# Patient Record
Sex: MALE
[De-identification: names, ages, dates, MRNs, and addresses within clinical notes are randomized per-mention and may not be internally consistent; named-entity substitution may affect disease eponyms.]

---

## 2022-09-01 ENCOUNTER — HOSPITAL ENCOUNTER (OUTPATIENT)
Dept: HOSPITAL 47 - ORPAIN | Age: 67
Discharge: HOME | End: 2022-09-01
Attending: ANESTHESIOLOGY
Payer: MEDICARE

## 2022-09-01 VITALS — RESPIRATION RATE: 18 BRPM

## 2022-09-01 VITALS — TEMPERATURE: 97 F

## 2022-09-01 VITALS — DIASTOLIC BLOOD PRESSURE: 71 MMHG | SYSTOLIC BLOOD PRESSURE: 129 MMHG | HEART RATE: 62 BPM

## 2022-09-01 DIAGNOSIS — I10: ICD-10-CM

## 2022-09-01 DIAGNOSIS — Z98.1: ICD-10-CM

## 2022-09-01 DIAGNOSIS — R51.9: ICD-10-CM

## 2022-09-01 DIAGNOSIS — G31.9: ICD-10-CM

## 2022-09-01 DIAGNOSIS — Z90.49: ICD-10-CM

## 2022-09-01 DIAGNOSIS — G91.2: Primary | ICD-10-CM

## 2022-09-01 DIAGNOSIS — E78.00: ICD-10-CM

## 2022-09-01 DIAGNOSIS — R26.89: ICD-10-CM

## 2022-09-01 DIAGNOSIS — Z91.030: ICD-10-CM

## 2022-09-01 LAB
GLUCOSE BLD-MCNC: 164 MG/DL (ref 70–110)
GLUCOSE CSF-MCNC: 100 MG/DL (ref 40–70)
NUC CELL # CSF: 0 U/L (ref 0–5)
RBC # CSF: 1 U/L (ref 0–10)
SPECIMEN VOL CSF: 7 ML

## 2022-09-01 PROCEDURE — 99152 MOD SED SAME PHYS/QHP 5/>YRS: CPT

## 2022-09-01 PROCEDURE — 87075 CULTR BACTERIA EXCEPT BLOOD: CPT

## 2022-09-01 PROCEDURE — 99153 MOD SED SAME PHYS/QHP EA: CPT

## 2022-09-01 PROCEDURE — 82945 GLUCOSE OTHER FLUID: CPT

## 2022-09-01 PROCEDURE — 84157 ASSAY OF PROTEIN OTHER: CPT

## 2022-09-01 PROCEDURE — 87205 SMEAR GRAM STAIN: CPT

## 2022-09-01 PROCEDURE — 87070 CULTURE OTHR SPECIMN AEROBIC: CPT

## 2022-09-01 PROCEDURE — 62270 DX LMBR SPI PNXR: CPT

## 2022-09-01 PROCEDURE — 88108 CYTOPATH CONCENTRATE TECH: CPT

## 2022-09-01 PROCEDURE — 89050 BODY FLUID CELL COUNT: CPT

## 2022-09-01 NOTE — P.PCN
Date of Procedure: 09/01/22


Description of Procedure: 


Procedure: 


1. Lumbar puncture for CSF collection








PREOPERATIVE DIAGNOSIS: Normal pressure hydrocephalus, and balance issues





POSTOPERATIVE DIAGNOSIS: Normal pressure hydrocephalus, and balance issues 





SURGEON: Lisandra Lyn 





ANESTHESIA:  Local with 1% lidocaine, and IV sedation : Versed 2 mg, and 

fentanyl 100 g





Sedation supervision start time: 0832


Sedation supervision end  time: 0917 





EBL: None.





Specimen removed: 26 mL of total CSF in collecting tube X4








PROCEDURE INDICATION:  The patient had history of patient had a history of 

headache, balance issues.  His neurologist recommended for 30 mL of CSF removal,

and opening, closing pressures .  Consulted for lumbar puncture for CSF 

collection send it for analysis. 





PROCEDURE DESCRIPTION: The patient was seen and identified.  Risks, benefits, 

complications, and alternatives were discussed with the patient. The patient 

agreed to proceed with the procedure and signed the consent, and vital signs 

were stable. 





Patient was taken to the procedure area, and time out was completed. The patient

was placed in left lateral  position on procedure.  . The lumbosacral area was 

prepped and draped in the usual sterile fashion. Critical pause was taken. Vital

signs were closely monitored during the procedure.





Posterior superior iliac crest landmarks was identified .  The midline lumbar 

space also identified.  Approximately at the level of L4-L5 attempted , L3-L4 

interspinous space,  skin and deeper tissues were localized with 5 mL of 1% 

lidocaine.   Using a 22  gauge 3.5 spinal needle entered into subarachnoid 

space. Clear CSF came out of the spinal needle.  7-8 mL of CSF fluid collected 

in each tube 4.  Total CSF collected 26 mL.  Needle was withdrawn intact, skin 

was cleansed, and bandages were applied. 





Opening pressure: 17 cm


Closing pressure: 6-7 cm 





COMPLICATIONS: None.





DISPOSITION / PLANS: The patient was placed in a supine position and transferred

to the recovery area in a stable condition for observation. Patient was 

discharged from the recovery room after meeting discharge criteria. Home 

discharge instructions given to the patient by the staff. The patient was 

reexamined prior to discharge.

## 2024-09-11 ENCOUNTER — HOSPITAL ENCOUNTER (INPATIENT)
Dept: HOSPITAL 47 - EC | Age: 69
LOS: 5 days | Discharge: NURSING, REHAB OR LONG-TERM CARE | DRG: 872 | End: 2024-09-16
Attending: INTERNAL MEDICINE | Admitting: INTERNAL MEDICINE
Payer: MEDICARE

## 2024-09-11 DIAGNOSIS — F32.A: ICD-10-CM

## 2024-09-11 DIAGNOSIS — L03.113: ICD-10-CM

## 2024-09-11 DIAGNOSIS — E78.5: ICD-10-CM

## 2024-09-11 DIAGNOSIS — Z79.899: ICD-10-CM

## 2024-09-11 DIAGNOSIS — G20.A1: ICD-10-CM

## 2024-09-11 DIAGNOSIS — Z79.84: ICD-10-CM

## 2024-09-11 DIAGNOSIS — B95.61: ICD-10-CM

## 2024-09-11 DIAGNOSIS — Z79.4: ICD-10-CM

## 2024-09-11 DIAGNOSIS — I10: ICD-10-CM

## 2024-09-11 DIAGNOSIS — Z72.0: ICD-10-CM

## 2024-09-11 DIAGNOSIS — K21.9: ICD-10-CM

## 2024-09-11 DIAGNOSIS — L25.9: ICD-10-CM

## 2024-09-11 DIAGNOSIS — M70.21: ICD-10-CM

## 2024-09-11 DIAGNOSIS — E11.65: ICD-10-CM

## 2024-09-11 DIAGNOSIS — A41.9: Primary | ICD-10-CM

## 2024-09-11 LAB
ALBUMIN SERPL-MCNC: 3.7 G/DL (ref 3.5–5)
ALP SERPL-CCNC: 102 U/L (ref 38–126)
ALT SERPL-CCNC: 25 U/L (ref 4–49)
ANION GAP SERPL CALC-SCNC: 12 MMOL/L
AST SERPL-CCNC: 25 U/L (ref 17–59)
BASOPHILS # BLD AUTO: 0 K/UL (ref 0–0.2)
BASOPHILS NFR BLD AUTO: 0 %
BUN SERPL-SCNC: 14 MG/DL (ref 9–20)
CALCIUM SPEC-MCNC: 9.6 MG/DL (ref 8.4–10.2)
CHLORIDE SERPL-SCNC: 98 MMOL/L (ref 98–107)
CO2 SERPL-SCNC: 23 MMOL/L (ref 22–30)
EOSINOPHIL # BLD AUTO: 0.2 K/UL (ref 0–0.7)
EOSINOPHIL NFR BLD AUTO: 1 %
ERYTHROCYTE [DISTWIDTH] IN BLOOD BY AUTOMATED COUNT: 5.1 M/UL (ref 4.3–5.9)
ERYTHROCYTE [DISTWIDTH] IN BLOOD: 14.8 % (ref 11.5–15.5)
GLUCOSE SERPL-MCNC: 285 MG/DL (ref 74–99)
HCT VFR BLD AUTO: 43.6 % (ref 39–53)
HGB BLD-MCNC: 13.5 GM/DL (ref 13–17.5)
LYMPHOCYTES # SPEC AUTO: 1.7 K/UL (ref 1–4.8)
LYMPHOCYTES NFR SPEC AUTO: 14 %
MCH RBC QN AUTO: 26.5 PG (ref 25–35)
MCHC RBC AUTO-ENTMCNC: 31.1 G/DL (ref 31–37)
MCV RBC AUTO: 85.4 FL (ref 80–100)
MONOCYTES # BLD AUTO: 1 K/UL (ref 0–1)
MONOCYTES NFR BLD AUTO: 8 %
NEUTROPHILS # BLD AUTO: 9.1 K/UL (ref 1.3–7.7)
NEUTROPHILS NFR BLD AUTO: 75 %
PLATELET # BLD AUTO: 320 K/UL (ref 150–450)
POTASSIUM SERPL-SCNC: 3.7 MMOL/L (ref 3.5–5.1)
PROT SERPL-MCNC: 6.4 G/DL (ref 6.3–8.2)
SODIUM SERPL-SCNC: 133 MMOL/L (ref 137–145)
WBC # BLD AUTO: 12.1 K/UL (ref 3.8–10.6)

## 2024-09-11 PROCEDURE — 70450 CT HEAD/BRAIN W/O DYE: CPT

## 2024-09-11 PROCEDURE — 36410 VNPNXR 3YR/> PHY/QHP DX/THER: CPT

## 2024-09-11 PROCEDURE — 99285 EMERGENCY DEPT VISIT HI MDM: CPT

## 2024-09-11 PROCEDURE — 96365 THER/PROPH/DIAG IV INF INIT: CPT

## 2024-09-11 PROCEDURE — 87040 BLOOD CULTURE FOR BACTERIA: CPT

## 2024-09-11 PROCEDURE — 80048 BASIC METABOLIC PNL TOTAL CA: CPT

## 2024-09-11 PROCEDURE — 76937 US GUIDE VASCULAR ACCESS: CPT

## 2024-09-11 PROCEDURE — 51702 INSERT TEMP BLADDER CATH: CPT

## 2024-09-11 PROCEDURE — 87324 CLOSTRIDIUM AG IA: CPT

## 2024-09-11 PROCEDURE — 86140 C-REACTIVE PROTEIN: CPT

## 2024-09-11 PROCEDURE — 80053 COMPREHEN METABOLIC PANEL: CPT

## 2024-09-11 PROCEDURE — 83605 ASSAY OF LACTIC ACID: CPT

## 2024-09-11 PROCEDURE — 85025 COMPLETE CBC W/AUTO DIFF WBC: CPT

## 2024-09-11 PROCEDURE — 36415 COLL VENOUS BLD VENIPUNCTURE: CPT

## 2024-09-11 RX ADMIN — ACETAMINOPHEN PRN MG: 325 TABLET, FILM COATED ORAL at 23:49

## 2024-09-11 RX ADMIN — CEFAZOLIN STA MLS/HR: 330 INJECTION, POWDER, FOR SOLUTION INTRAMUSCULAR; INTRAVENOUS at 22:17

## 2024-09-11 RX ADMIN — CEFAZOLIN SCH: 330 INJECTION, POWDER, FOR SOLUTION INTRAMUSCULAR; INTRAVENOUS at 23:45

## 2024-09-11 NOTE — ED
Extremity Problem HPI





- General


Chief complaint: Extremity Problem,Nontraumatic


Stated complaint: R hand swelling


Time Seen by Provider: 09/11/24 22:10


Source: patient, EMS, RN notes reviewed


Mode of arrival: EMS


Limitations: no limitations





- History of Present Illness


Initial comments: 





68-year-old male presenting for medical attention for right wrist redness x 3 

days.  Patient states he has been having right wrist and right elbow redness, 

swelling, and warmth.  Patient states he was seen at [facility] 3 days ago where

they drained his right elbow and discharged him on antibiotics.  He states 

symptoms have been worsening.  He admits fever, chills.  Denies nausea/vomiting.

 Past medical history includes hypertension and diabetes.  Denies trauma or 

injury.





- Related Data


                                Home Medications











 Medication  Instructions  Recorded  Confirmed


 


Atorvastatin [Lipitor] 40 mg PO HS 08/31/22 08/31/22


 


Carbidopa/Levodopa 1 tab PO 0800 08/31/22 08/31/22





[Carbidopa-Levodopa  Tab]   


 


Carbidopa/Levodopa 1 tab PO 1400 08/31/22 08/31/22





[Carbidopa-Levodopa  Tab]   


 


Colestipol HCl 2 tab PO BID 08/31/22 08/31/22


 


Empagliflozin [Jardiance] 1 tab PO DAILY 08/31/22 08/31/22


 


Escitalopram [Lexapro] 5 mg PO Q48H 08/31/22 08/31/22


 


Escitalopram [Lexapro] 10 mg PO Q48H 08/31/22 08/31/22


 


Insulin Glargine,Hum.rec.anlog 54 unit SQ DAILY 08/31/22 08/31/22





[Basaglar Kwikpen U-100]   


 


Losartan Potassium 100 mg PO DAILY 08/31/22 08/31/22


 


Pantoprazole Sodium 40 mg PO DAILY 08/31/22 08/31/22


 


Unk Mens Vitamin 1 tab PO DAILY 08/31/22 08/31/22


 


hydrALAZINE HCL 25 mg PO BID 08/31/22 08/31/22


 


hydroCHLOROthiazide 50 mg PO DAILY 08/31/22 08/31/22


 


metFORMIN HCL 1,000 mg PO BID 08/31/22 08/31/22











                                    Allergies











Allergy/AdvReac Type Severity Reaction Status Date / Time


 


bee venom protein (honey bee) Allergy Intermediate Swelling Verified 09/01/22 

07:58














Review of Systems


ROS Statement: 


Those systems with pertinent positive or pertinent negative responses have been 

documented in the HPI.





ROS Other: All systems not noted in ROS Statement are negative.





Past Medical History


Past Medical History: Diabetes Mellitus, GERD/Reflux, Hyperlipidemia, 

Hypertension, Osteoarthritis (OA), Prostate Disorder


Additional Past Medical History / Comment(s): parkinsons , arthritis in knees,? 

hydrocephalus. gait and balance off and some confusion. Bursitis right elbow


History of Any Multi-Drug Resistant Organisms: None Reported


Past Surgical History: Cholecystectomy


Additional Past Surgical History / Comment(s): cervical fusion 4-7,


Past Anesthesia/Blood Transfusion Reactions: No Reported Reaction


Additional Past Anesthesia/Blood Transfusion Reaction / Comment(s): no blood 

transfusions


Past Psychological History: Depression


Smoking Status: Former smoker


Past Alcohol Use History: Rare


Past Drug Use History: None Reported





- Past Family History


  ** Father


Family Medical History: CVA/TIA, Hypertension





  ** Mother


Family Medical History: Diabetes Mellitus





General Exam


Limitations: no limitations


General appearance: alert, in no apparent distress


Head exam: Present: atraumatic, normocephalic, normal inspection


Respiratory exam: Present: normal lung sounds bilaterally.  Absent: respiratory 

distress, wheezes, rales, rhonchi, stridor


Cardiovascular Exam: Present: regular rate, normal rhythm, normal heart sounds. 

Absent: systolic murmur, diastolic murmur, rubs, gallop, clicks


  ** Right


Shoulder Exam: Present: normal inspection, full ROM.  Absent: tenderness, 

swelling


Upper Arm exam: Present: normal inspection, full ROM.  Absent: tenderness, 

swelling


Elbow exam: Present: full ROM, tenderness, swelling, erythema.  Absent: normal 

inspection (Diffuse erythema, warmth, edema on posterior right elbow with 

shallow, purulent ulceration)


Forearm Wrist exam: Present: full ROM, tenderness, swelling.  Absent: normal 

inspection (Diffuse erythema, warmth, edema present on dorsal aspect of right 

wrist.  No purulence or fluctuant masses.  Noncircumferential.)


Vascular: Present: normal capillary refill, radial pulse (Cap refill less than 2

seconds.).  Absent: vascular compromise


Neurological exam: Present: alert, oriented X3


Psychiatric exam: Present: normal affect, normal mood


Skin exam: Present: warm, intact, normal color





Course


                                   Vital Signs











  09/11/24 09/11/24





  20:41 22:00


 


Temperature 100.1 F H 


 


Pulse Rate 89 81


 


Respiratory 20 16





Rate  


 


Blood Pressure 150/79 131/81


 


O2 Sat by Pulse 95 96





Oximetry  














Medical Decision Making





- Medical Decision Making





Was pt. sent in by a medical professional or institution (, PA, NP, urgent 

care, hospital, or nursing home...) When possible be specific


@ -Sent by [facility]


Did you speak to anyone other than the patient for history (EMS, parent, family,

police, friend...)? What history was obtained from this source 


@ -No


Did you review nursing and triage notes (agree or disagree)? Why? 


@ -I reviewed and agree with nursing and triage notes


Were old charts reviewed (outside hosp., previous admission, EMS record, old 

EKG, old radiological studies, urgent care reports/EKG's, nursing home records)?

Report findings 


@ -No old charts were reviewed


Differential Diagnosis (chest pain, altered mental status, abdominal pain women,

abdominal pain men, vaginal bleeding, weakness, fever, dyspnea, syncope, 

headache, dizziness, GI bleed, back pain, seizure, CVA, palpatations, mental 

health, musculoskeletal)? 


@ -Differential Musculoskeletal


Muscular strain, contusion, ligament sprain, fracture, arthritis, septic 

arthritis, bursitis, cellulitis, muscle spasm, nerve compression, DVT, arterial 

occlusion, herpes zoster, electrolyte abnormality, tumor.... This is not meant 

to be in all inclusive list


EKG interpreted by me (3pts min.).


@ -None


X-rays interpreted by me (1pt min.).


@ -None done


CT interpreted by me (1pt min.).


@ -None done


U/S interpreted by me (1pt. min.).


@ -None done


What testing was considered but not performed or refused? (CT, X-rays, U/S, 

labs)? Why?


@ -None


What meds were considered but not given or refused? Why?


@ -None


Did you discuss the management of the patient with other professionals 

(professionals i.e. , PA, NP, lab, RT, psych nurse, , , 

teacher, , )? Give summary


@ -Spoke with Dr. Hung who accepts admission at this time for cellulitis with 

failed outpatient treatment, requesting orthopedic consultation


Was smoking cessation discussed for >3mins.?


@ -No


Was critical care preformed (if so, how long)?


@ -No


Were there social determinants of health that impacted care today? How? 

(Homelessness, low income, unemployed, alcoholism, drug addiction, transportat

ion, low edu. Level, literacy, decrease access to med. care, care home, rehab)?


@ -No


Was there de-escalation of care discussed even if they declined (Discuss DNR or 

withdrawal of care, Hospice)? DNR status


@ -No


What co-morbidities impacted this encounter? (DM, HTN, Smoking, COPD, CAD, 

Cancer, CVA, ARF, Chemo, Hep., AIDS, mental health diagnosis, sleep apnea, 

morbid obesity)?


@ -None


Was patient admitted / discharged? Hospital course, mention meds given and 

route, prescriptions, significant lab abnormalities, going to OR and other 

pertinent info.


@ -Patient was admitted.  This is a 68-year-old male sent by [facility] with 

history of hypertension and diabetes presenting for right wrist and elbow r

edness x 3 days.  Has been on oral antibiotics for cellulitis with worsening of 

symptoms.  Vital signs remarkable for temperature of 100.1, otherwise within 

normal limits.  On examination, right wrist and elbow are warm, erythematous, 

and edematous.  Full range of motion, neurovascularly intact.  Lab work 

remarkable for white blood cell count of 12 with left shift, lactic 2.9, CRP 

4.4.  Blood cultures were taken, patient was started on IV cefazolin and IV 

fluids.  I spoke with Dr. Hung who accepts admission at this time for 

cellulitis with failed outpatient treatment, requesting orthopedic consultation.

 Patient is agreeable to plan.  Case discussed with my ED attending Dr. Canseco.


Undiagnosed new problem with uncertain prognosis?


@ -No


Drug Therapy requiring intensive monitoring for toxicity (Heparin, Nitro, 

Insulin, Cardizem)?


@ -No


Were any procedures done?


@ -No


Diagnosis/symptom?


@ -Cellulitis of right wrist and elbow


Acute, or Chronic, or Acute on Chronic?


@ -Acute


Uncomplicated (without systemic symptoms) or Complicated (systemic symptoms)?


@ -Complicated


Side effects of treatment?


@ -No


Exacerbation, Progression, or Severe Exacerbation?


@ -No


Poses a threat to life or bodily function? How? (Chest pain, USA, MI, pneumonia,

PE, COPD, DKA, ARF, appy, cholecystitis, CVA, Diverticulitis, Homicidal, 

Suicidal, threat to staff... and all critical care pts)


@ -Yes





- Lab Data


Result diagrams: 


                                 09/11/24 20:49





                                 09/11/24 20:49


                                   Lab Results











  09/11/24 09/11/24 09/11/24 Range/Units





  20:49 20:49 20:49 


 


WBC  12.1 H    (3.8-10.6)  k/uL


 


RBC  5.10    (4.30-5.90)  m/uL


 


Hgb  13.5    (13.0-17.5)  gm/dL


 


Hct  43.6    (39.0-53.0)  %


 


MCV  85.4    (80.0-100.0)  fL


 


MCH  26.5    (25.0-35.0)  pg


 


MCHC  31.1    (31.0-37.0)  g/dL


 


RDW  14.8    (11.5-15.5)  %


 


Plt Count  320    (150-450)  k/uL


 


MPV  8.4    


 


Neutrophils %  75    %


 


Lymphocytes %  14    %


 


Monocytes %  8    %


 


Eosinophils %  1    %


 


Basophils %  0    %


 


Neutrophils #  9.1 H    (1.3-7.7)  k/uL


 


Lymphocytes #  1.7    (1.0-4.8)  k/uL


 


Monocytes #  1.0    (0-1.0)  k/uL


 


Eosinophils #  0.2    (0-0.7)  k/uL


 


Basophils #  0.0    (0-0.2)  k/uL


 


Hypochromasia  Moderate    


 


Sodium   133 L   (137-145)  mmol/L


 


Potassium   3.7   (3.5-5.1)  mmol/L


 


Chloride   98   ()  mmol/L


 


Carbon Dioxide   23   (22-30)  mmol/L


 


Anion Gap   12   mmol/L


 


BUN   14   (9-20)  mg/dL


 


Creatinine   0.70   (0.66-1.25)  mg/dL


 


Est GFR (CKD-EPI)AfAm   >90   (>60 ml/min/1.73 sqM)  


 


Est GFR (CKD-EPI)NonAf   >90   (>60 ml/min/1.73 sqM)  


 


Glucose   285 H   (74-99)  mg/dL


 


Plasma Lactic Acid Peter    2.9 H*  (0.7-2.0)  mmol/L


 


Calcium   9.6   (8.4-10.2)  mg/dL


 


Total Bilirubin   0.5   (0.2-1.3)  mg/dL


 


AST   25   (17-59)  U/L


 


ALT   25   (4-49)  U/L


 


Alkaline Phosphatase   102   ()  U/L


 


C-Reactive Protein   4.4 H   (<1.0)  mg/dL


 


Total Protein   6.4   (6.3-8.2)  g/dL


 


Albumin   3.7   (3.5-5.0)  g/dL














Disposition


Clinical Impression: 


 Cellulitis of right upper extremity





Disposition: ADMITTED AS IP TO THIS [facility]


Referrals: 


Dhaval August MD [Primary Care Provider] - 1-2 days


Time of Disposition: 23:12 PAST MEDICAL HISTORY:  Cholecystitis     Cholelithiasis     PCOS (polycystic ovarian syndrome)

## 2024-09-12 LAB — GLUCOSE BLD-MCNC: 171 MG/DL (ref 70–110)

## 2024-09-12 RX ADMIN — ENOXAPARIN SODIUM SCH MG: 40 INJECTION SUBCUTANEOUS at 20:33

## 2024-09-12 RX ADMIN — LOSARTAN POTASSIUM SCH MG: 50 TABLET, FILM COATED ORAL at 17:09

## 2024-09-12 RX ADMIN — TAMSULOSIN HYDROCHLORIDE SCH MG: 0.4 CAPSULE ORAL at 17:09

## 2024-09-12 RX ADMIN — ESCITALOPRAM OXALATE SCH MG: 10 TABLET, FILM COATED ORAL at 20:35

## 2024-09-12 RX ADMIN — CARBIDOPA AND LEVODOPA SCH EACH: 25; 100 TABLET ORAL at 17:08

## 2024-09-12 RX ADMIN — ATORVASTATIN CALCIUM SCH MG: 40 TABLET, FILM COATED ORAL at 20:35

## 2024-09-12 NOTE — P.CONS
History of Present Illness





- Reason for Consult


Consult date: 09/12/24


Right elbow infection not healing


Requesting physician: Laurie Olguin





- Chief Complaint


Right elbow pain and swelling x days





- History of Present Illness





Patient is a 68-year-old  male with a past medical history significant 

for diabetes mellitus hypertension hyperlipidemia reflux osteoarthritis prostate

disorder in this patient apparently was recently admitted at [facility] from 9/2/2024 till 9/5/2024 with the patient has been treated for UTI 

he was also diagnosed with a right elbow olecranon bursitis status post bedside 

aspiration by the hospitalist and the patient was subsequent discharged home on 

oral Augmentin and doxycycline patient now presenting to [facility] ER 

complaining of worsening pain to the right elbow area patient describing the 

pain to the right elbow to be sharp almost 10 out of 10 in severity by the time 

he presented to hospital with associated swelling and redness but denies having 

any drainage patient mention he has been taking his antibiotic as advised 

patient on presentation to the hospital did have low-grade fever 100.1 F 

patient was not tachycardic hypotensive or hypoxic he did have white count of 

12.1 with a left shift creatinine 0.70 lactic acid 2.9 liver isms are normal 

patient did receive a dose of cefazolin and admitted to hospital infectious he 

was consulted for further management of antibiotic therapy patient has been eval

by orthopedics not recommending any surgical intervention at this point





Review of Systems





Positive point and negatives has been  mentioned in the HPI, complete review of 

systems was performed and all other systems are negative





Past Medical History


Past Medical History: Diabetes Mellitus, GERD/Reflux, Hyperlipidemia, 

Hypertension, Osteoarthritis (OA), Prostate Disorder


Additional Past Medical History / Comment(s): parkinsons , arthritis in knees,? 

hydrocephalus. gait and balance off and some confusion. Bursitis right elbow


History of Any Multi-Drug Resistant Organisms: None Reported


Past Surgical History: Cholecystectomy


Additional Past Surgical History / Comment(s): cervical fusion 4-7,


Past Anesthesia/Blood Transfusion Reactions: No Reported Reaction


Additional Past Anesthesia/Blood Transfusion Reaction / Comm: no blood transfusi

ons


Smoking Status: Never smoker





- Past Family History


  ** Father


Family Medical History: CVA/TIA, Hypertension





  ** Mother


Family Medical History: Diabetes Mellitus


Additional Family Medical History / Comment(s): parkinsons





Medications and Allergies


                                Home Medications











 Medication  Instructions  Recorded  Confirmed  Type


 


Atorvastatin [Lipitor] 40 mg PO HS 08/31/22 09/12/24 History


 


Carbidopa/Levodopa 1 tab PO Q8H 08/31/22 09/12/24 History





[Carbidopa-Levodopa  Tab]    


 


Empagliflozin [Jardiance] 10 mg PO DAILY 08/31/22 09/12/24 History


 


Escitalopram [Lexapro] 10 mg PO HS 08/31/22 09/12/24 History


 


Insulin Glargine,Hum.rec.anlog 54 unit SQ DAILY 08/31/22 09/12/24 History





[Basaglar Kwikpen U-100]    


 


Losartan Potassium 100 mg PO DAILY 08/31/22 09/12/24 History


 


Pantoprazole Sodium 40 mg PO DAILY 08/31/22 09/12/24 History


 


hydrALAZINE HCL 25 mg PO BID 08/31/22 09/12/24 History


 


0.9 % Sodium Chloride [Sodium 10 ml IV Q12H 09/12/24 09/12/24 History





Chloride Flush]    


 


0.9 % Sodium Chloride [Sodium 10 ml IV Q12H PRN 09/12/24 09/12/24 History





Chloride Flush]    


 


Acetaminophen [Tylenol 8 Hour] 650 mg PO Q6H PRN 09/12/24 09/12/24 History


 


Amoxic-Pot Clav 875-125Mg 1 tab PO Q12HR 09/12/24 09/12/24 History





[Augmentin 875-125]    


 


DULoxetine HCL [Cymbalta] 30 mg PO DAILY 09/12/24 09/12/24 History


 


Doxycycline Monohydrate 100 mg PO BID 09/12/24 09/12/24 History


 


EPINEPHrine (Auto Inject) [Epipen] 0.3 mg IM ONCE PRN 09/12/24 09/12/24 History


 


L.acidoph,Paracasei, B.lactis 1 cap PO HS 09/12/24 09/12/24 History





[Probiotic]    


 


Naloxone HCl [Narcan] 4 mg NASAL ONCE PRN 09/12/24 09/12/24 History


 


Nicotine 14Mg/24Hr Patch [Habitrol 1 patch TRANSDERM DAILY 09/12/24 09/12/24 

History





14Mg/24Hr Patch]    


 


QUEtiapine [SEROquel] 50 mg PO HS 09/12/24 09/12/24 History


 


Tamsulosin HCl [Flomax] 0.4 mg PO DAILY 09/12/24 09/12/24 History


 


cefTRIAXone SODIUM [Ceftriaxone] 2 gm IV Q24H 09/12/24 09/12/24 History


 


hydroCHLOROthiazide [Hydrodiuril] 25 mg PO DAILY 09/12/24 09/12/24 History


 


metFORMIN HCL [Glucophage] 1,000 mg PO BID 09/12/24 09/12/24 History


 


oxyCODONE HCL [oxyCODONE HCL (IR)] 5 mg PO Q6H PRN 09/12/24 09/12/24 History








                                    Allergies











Allergy/AdvReac Type Severity Reaction Status Date / Time


 


bee venom protein (honey bee) Allergy Intermediate Swelling Verified 09/12/24 

09:04














Physical Exam


Vitals: 


                                   Vital Signs











  Temp Pulse Pulse Pulse Resp BP BP


 


 09/12/24 08:00      18  


 


 09/12/24 07:00  97.7 F    70  18   172/82


 


 09/12/24 02:25  98.5 F   75   18   148/71


 


 09/12/24 02:03  98.1 F      


 


 09/12/24 02:02   73    19  153/82 


 


 09/12/24 01:32   76    20  153/81 


 


 09/12/24 00:51  98.9 F      


 


 09/11/24 23:59   81    20  151/78 


 


 09/11/24 23:00   84    15  117/70 


 


 09/11/24 22:00   81    16  131/81 


 


 09/11/24 20:41  100.1 F H  89    20  150/79 














  Pulse Ox


 


 09/12/24 08:00 


 


 09/12/24 07:00  97


 


 09/12/24 02:25  95


 


 09/12/24 02:03 


 


 09/12/24 02:02  98


 


 09/12/24 01:32  96


 


 09/12/24 00:51 


 


 09/11/24 23:59  97


 


 09/11/24 23:00  98


 


 09/11/24 22:00  96


 


 09/11/24 20:41  95








                                Intake and Output











 09/11/24 09/12/24 09/12/24





 22:59 06:59 14:59


 


Intake Total  240 120


 


Output Total   300


 


Balance  240 -180


 


Intake:   


 


  Oral  240 120


 


Output:   


 


  Urine   300


 


Other:   


 


  Voiding Method   Urinal





   Diaper


 


  # Voids  2 1


 


  # Bowel Movements   2


 


  Weight 88.36 kg 88.36 kg 














GENERAL DESCRIPTION: Elderly male lying in bed, no distress. No tachypnea or 

accessory muscle of respiration use.


HEENT: Shows Pallor , no scleral icterus. Oral mucous membrane is dry. No 

pharyngeal erythema or thrush


NECK: Trachea central, no thyromegaly.


LUNGS: Unlabored breathing. Clear to auscultation anteriorly. No wheeze or 

crackle.


HEART: S1, S2, regular rate and rhythm. No loud murmur


ABDOMEN: Soft, no tenderness , guarding or rigidity, no organomegaly


EXTREMITIES: Right elbow did have swelling redness which is warm and tender to 

touch


SKIN: No rash, no masses palpable.


NEUROLOGICAL: The patient is awake, alert, oriented x3, mood and affect normal.














Results


CBC & Chem 7: 


                                 09/11/24 20:49





                                 09/11/24 20:49


Labs: 


                  Abnormal Lab Results - Last 24 Hours (Table)











  09/11/24 09/11/24 09/11/24 Range/Units





  20:49 20:49 20:49 


 


WBC  12.1 H    (3.8-10.6)  k/uL


 


Neutrophils #  9.1 H    (1.3-7.7)  k/uL


 


Sodium   133 L   (137-145)  mmol/L


 


Glucose   285 H   (74-99)  mg/dL


 


POC Glucose (mg/dL)     ()  mg/dL


 


Plasma Lactic Acid Peter    2.9 H*  (0.7-2.0)  mmol/L


 


C-Reactive Protein   4.4 H   (<1.0)  mg/dL














  09/12/24 Range/Units





  01:27 


 


WBC   (3.8-10.6)  k/uL


 


Neutrophils #   (1.3-7.7)  k/uL


 


Sodium   (137-145)  mmol/L


 


Glucose   (74-99)  mg/dL


 


POC Glucose (mg/dL)  171 H  ()  mg/dL


 


Plasma Lactic Acid Peter   (0.7-2.0)  mmol/L


 


C-Reactive Protein   (<1.0)  mg/dL














Assessment and Plan


(1) Cellulitis of right upper extremity


Current Visit: Yes   Status: Acute   Code(s): L03.113 - CELLULITIS OF RIGHT 

UPPER LIMB   SNOMED Code(s): 90858057454487960


   





(2) Olecranon bursitis, right elbow


Current Visit: Yes   Status: Acute   Code(s): M70.21 - OLECRANON BURSITIS, RIGHT

ELBOW   SNOMED Code(s): 568655230990779


   





(3) Failure of outpatient treatment


Current Visit: Yes   Status: Acute   Code(s): Z78.9 - OTHER SPECIFIED HEALTH 

STATUS   SNOMED Code(s): 882123709


   





(4) MSSA (methicillin susceptible Staphylococcus aureus) infection


Current Visit: Yes   Status: Acute   Code(s): A49.01 - METHICILLIN SUSCEP STAPH 

INFECTION, UNSP SITE   SNOMED Code(s): 167389474


   


Plan: 





1patient presented to hospital with right elbow pain swelling redness which is 

likely related to right elbow olecranon bursitis that has been drained at [facility] during his admission from 9/2 through 9/5/2024 I was able to 

review the culture and they have been growing MSSA patient seem to have failed 

outpatient oral antibiotic therapy


2-we will start the patient on cefazolin 2 g every 8 hour


3-will benefit from surgical I&D


We will follow on clinical condition and cultures to further adjust medication 

if needed


Thank you for this consultation we will follow the patient along with you


Dictation was produced using dragon dictation software. please excuse any 

grammatical, word or spelling errors. Reviewed and agree.

## 2024-09-12 NOTE — XR
EXAMINATION TYPE: XR shoulder complete LT

 

DATE OF EXAM: 9/12/2024

 

CLINICAL HISTORY: pain

 

COMPARISON: NONE

 

TECHNIQUE:  Three views of the left shoulder are obtained.

 

FINDINGS:  There is no acute fracture/dislocation evident.  The acromioclavicular and glenohumeral cindy
int spaces appear within normal limits.  The visualized ribs are intact and unremarkable.

 

IMPRESSION: 

 

1. There is no acute fracture or dislocation. 

 

ICD 10 NO FRACTURE, INITIAL EVALUATION

## 2024-09-12 NOTE — P.HPIM
History of Present Illness


H&P Date: 09/12/24





Patient is a 68-year-old male who presents with right elbow swelling and pain.  

He states that he has been having difficulty with balance/weakness and recently 

fell.  Since his fall he has had right elbow swelling and erythemahe went to 

[facility] (according to his wife) the other day where they drained it and 

prescribed outpatient antibiotics.  Since then the elbow has not improved so he 

was admitted here.  He rates the pain a 6/10.  He denies chest pain, shortness 

of breath, abdominal pain.  He endorses nonbloody diarrhea since beginning the 

antibiotics from [facility].





WBCs 12.1, lactic acid 2.9, CRP 4.4


Afebrile, hypertensive.


 


ED documentation reviewed.  Unable to obtain records from [facility].


 


Review of systems:


Pertinent positives and negatives as discussed in HPI, a complete review of 

systems was performed and all other systems are negative.


 


 


Social history:


Tobacco: chew tobacco occasional 


Alcohol: denies


Recreational drugs: denies


Travel: denies


Occupation: retired


 


Physical examination:


Vital signs are stable.


General: No acute distress.


HEENT:  Head exam is unremarkable. 


Lungs: Bilateral breath sounds present; no rhonchi, wheezes, or rales.


Heart: Rate and rhythm are regular.  S1S2 present.  No murmurs/rubs/gallops.


Abdomen: Nontender.


Extremities: No edema present in the lower extremities.  Right elbow: Edema of 

the olecranon bursa, healing abrasion present, mild swelling of the forearm, 

wrist, hand, full range of motion of the elbow.


 


Assessment/Plan:


#.  Right elbow cellulitis


Maintain IV ceftriaxone.


Consult infectious disease.


 


#. Hypertension


Continue


 


#. Diabetes mellitus type 2


Insulin sliding scale





#. Non-bloody diarrhea


Obtain C. difficile PCR.


 


#. Hyperlipidemia


Continue





#.  Parkinson's


Continue carbidopalevodopa.


 


DVT prophylaxis: Lovenox


 


Chronic conditions:  HTN, DM2, HLD, Parkinson's


 


The patient is admitted with an anticipated plus than 2 midnight stay for 

evaluation of right elbow pain and swelling.


CODE STATUS: Full code


Discussed with: Patient


Anticipated discharge place: [facility]





Dr. Enmanuel MD


I have performed a history and physical examination and medical decision making 

of this patient, discussed the same with the the resident, and agree with the 

assessment and plan as written.  I performed brief physical exam.








Past Medical History


Past Medical History: Diabetes Mellitus, GERD/Reflux, Hyperlipidemia, 

Hypertension, Osteoarthritis (OA), Prostate Disorder


Additional Past Medical History / Comment(s): parkinsons , arthritis in knees,? 

hydrocephalus. gait and balance off and some confusion. Bursitis right elbow


History of Any Multi-Drug Resistant Organisms: None Reported


Past Surgical History: Cholecystectomy


Additional Past Surgical History / Comment(s): cervical fusion 4-7,


Past Anesthesia/Blood Transfusion Reactions: No Reported Reaction


Additional Past Anesthesia/Blood Transfusion Reaction / Comment(s): no blood 

transfusions


Smoking Status: Never smoker





- Past Family History


  ** Father


Family Medical History: CVA/TIA, Hypertension





  ** Mother


Family Medical History: Diabetes Mellitus





Medications and Allergies


                                Home Medications











 Medication  Instructions  Recorded  Confirmed  Type


 


Atorvastatin [Lipitor] 40 mg PO HS 08/31/22 09/12/24 History


 


Carbidopa/Levodopa 1 tab PO Q8H 08/31/22 09/12/24 History





[Carbidopa-Levodopa  Tab]    


 


Empagliflozin [Jardiance] 10 mg PO DAILY 08/31/22 09/12/24 History


 


Escitalopram [Lexapro] 10 mg PO HS 08/31/22 09/12/24 History


 


Insulin Glargine,Hum.rec.anlog 54 unit SQ DAILY 08/31/22 09/12/24 History





[Basaglar Kwikpen U-100]    


 


Losartan Potassium 100 mg PO DAILY 08/31/22 09/12/24 History


 


Pantoprazole Sodium 40 mg PO DAILY 08/31/22 09/12/24 History


 


hydrALAZINE HCL 25 mg PO BID 08/31/22 09/12/24 History


 


0.9 % Sodium Chloride [Sodium 10 ml IV Q12H 09/12/24 09/12/24 History





Chloride Flush]    


 


0.9 % Sodium Chloride [Sodium 10 ml IV Q12H PRN 09/12/24 09/12/24 History





Chloride Flush]    


 


Acetaminophen [Tylenol 8 Hour] 650 mg PO Q6H PRN 09/12/24 09/12/24 History


 


Amoxic-Pot Clav 875-125Mg 1 tab PO Q12HR 09/12/24 09/12/24 History





[Augmentin 875-125]    


 


DULoxetine HCL [Cymbalta] 30 mg PO DAILY 09/12/24 09/12/24 History


 


Doxycycline Monohydrate 100 mg PO BID 09/12/24 09/12/24 History


 


EPINEPHrine (Auto Inject) [Epipen] 0.3 mg IM ONCE PRN 09/12/24 09/12/24 History


 


L.acidoph,Paracasei, B.lactis 1 cap PO HS 09/12/24 09/12/24 History





[Probiotic]    


 


Naloxone HCl [Narcan] 4 mg NASAL ONCE PRN 09/12/24 09/12/24 History


 


Nicotine 14Mg/24Hr Patch [Habitrol 1 patch TRANSDERM DAILY 09/12/24 09/12/24 

History





14Mg/24Hr Patch]    


 


QUEtiapine [SEROquel] 50 mg PO HS 09/12/24 09/12/24 History


 


Tamsulosin HCl [Flomax] 0.4 mg PO DAILY 09/12/24 09/12/24 History


 


cefTRIAXone SODIUM [Ceftriaxone] 2 gm IV Q24H 09/12/24 09/12/24 History


 


hydroCHLOROthiazide [Hydrodiuril] 25 mg PO DAILY 09/12/24 09/12/24 History


 


metFORMIN HCL [Glucophage] 1,000 mg PO BID 09/12/24 09/12/24 History


 


oxyCODONE HCL [oxyCODONE HCL (IR)] 5 mg PO Q6H PRN 09/12/24 09/12/24 History








                                    Allergies











Allergy/AdvReac Type Severity Reaction Status Date / Time


 


bee venom protein (honey bee) Allergy Intermediate Swelling Verified 09/12/24 

09:04














Physical Exam


Vitals: 


                                   Vital Signs











  Temp Pulse Pulse Resp BP BP Pulse Ox


 


 09/12/24 02:25  98.5 F   75  18   148/71  95


 


 09/12/24 02:03  98.1 F      


 


 09/12/24 02:02   73   19  153/82   98


 


 09/12/24 01:32   76   20  153/81   96


 


 09/12/24 00:51  98.9 F      


 


 09/11/24 23:59   81   20  151/78   97


 


 09/11/24 23:00   84   15  117/70   98


 


 09/11/24 22:00   81   16  131/81   96


 


 09/11/24 20:41  100.1 F H  89   20  150/79   95








                                Intake and Output











 09/11/24 09/11/24 09/12/24





 14:59 22:59 06:59


 


Intake Total   240


 


Balance   240


 


Intake:   


 


  Oral   240


 


Other:   


 


  # Voids   2


 


  Weight  88.36 kg 88.36 kg














Results


CBC & Chem 7: 


                                 09/13/24 07:52





                                 09/13/24 07:52


Labs: 


                  Abnormal Lab Results - Last 24 Hours (Table)











  09/11/24 09/11/24 09/11/24 Range/Units





  20:49 20:49 20:49 


 


WBC  12.1 H    (3.8-10.6)  k/uL


 


Neutrophils #  9.1 H    (1.3-7.7)  k/uL


 


Sodium   133 L   (137-145)  mmol/L


 


Glucose   285 H   (74-99)  mg/dL


 


POC Glucose (mg/dL)     ()  mg/dL


 


Plasma Lactic Acid Peter    2.9 H*  (0.7-2.0)  mmol/L


 


C-Reactive Protein   4.4 H   (<1.0)  mg/dL














  09/12/24 Range/Units





  01:27 


 


WBC   (3.8-10.6)  k/uL


 


Neutrophils #   (1.3-7.7)  k/uL


 


Sodium   (137-145)  mmol/L


 


Glucose   (74-99)  mg/dL


 


POC Glucose (mg/dL)  171 H  ()  mg/dL


 


Plasma Lactic Acid Peter   (0.7-2.0)  mmol/L


 


C-Reactive Protein   (<1.0)  mg/dL

## 2024-09-12 NOTE — P.CNOR
History of Present Illness





- [facility]


Consult date: 09/12/24


Consult reason: joint pain (Right elbow pain, swelling)


History of present illness: 


This is a 68-year-old male with history of injury to his right elbow a couple of

weeks ago when he fell.  He resides in an extended care facility and states that

he fell on the elbow and had a large abrasion.  He then developed some swelling 

to the elbow.  The elbow was aspirated in the emergency department at [facility] Last week the patient was then admitted here at [facility]. We are 

consulted for thick evaluation of the right elbow.  He had a temp of 100.1 last 

evening.  I blood cell count was elevated at 12.1.








Past Medical History


Past Medical History: Diabetes Mellitus, GERD/Reflux, Hyperlipidemia, 

Hypertension, Osteoarthritis (OA), Prostate Disorder


Additional Past Medical History / Comment(s): parkinsons , arthritis in knees,? 

hydrocephalus. gait and balance off and some confusion. Bursitis right elbow


History of Any Multi-Drug Resistant Organisms: None Reported


Past Surgical History: Cholecystectomy


Additional Past Surgical History / Comment(s): cervical fusion 4-7,


Past Anesthesia/Blood Transfusion Reactions: No Reported Reaction


Additional Past Anesthesia/Blood Transfusion Reaction / Comm: no blood 

transfusions


Past Psychological History: Depression


Additional Psychological History / Comment(s): on meds


Smoking Status: Never smoker


Past Alcohol Use History: Rare


Additional Past Alcohol Use History / Comment(s): 2ppd started at 15ys old. 

smoked for 30 yrs


Past Drug Use History: None Reported





- Past Family History


  ** Father


Family Medical History: CVA/TIA, Hypertension





  ** Mother


Family Medical History: Diabetes Mellitus


Additional Family Medical History / Comment(s): parkinsons





Medications and Allergies


                                Home Medications











 Medication  Instructions  Recorded  Confirmed  Type


 


Atorvastatin [Lipitor] 40 mg PO HS 08/31/22 09/12/24 History


 


Carbidopa/Levodopa 1 tab PO Q8H 08/31/22 09/12/24 History





[Carbidopa-Levodopa  Tab]    


 


Empagliflozin [Jardiance] 10 mg PO DAILY 08/31/22 09/12/24 History


 


Escitalopram [Lexapro] 10 mg PO HS 08/31/22 09/12/24 History


 


Insulin Glargine,Hum.rec.anlog 54 unit SQ DAILY 08/31/22 09/12/24 History





[Basaglar Kwikpen U-100]    


 


Losartan Potassium 100 mg PO DAILY 08/31/22 09/12/24 History


 


Pantoprazole Sodium 40 mg PO DAILY 08/31/22 09/12/24 History


 


hydrALAZINE HCL 25 mg PO BID 08/31/22 09/12/24 History


 


0.9 % Sodium Chloride [Sodium 10 ml IV Q12H 09/12/24 09/12/24 History





Chloride Flush]    


 


0.9 % Sodium Chloride [Sodium 10 ml IV Q12H PRN 09/12/24 09/12/24 History





Chloride Flush]    


 


Acetaminophen [Tylenol 8 Hour] 650 mg PO Q6H PRN 09/12/24 09/12/24 History


 


Amoxic-Pot Clav 875-125Mg 1 tab PO Q12HR 09/12/24 09/12/24 History





[Augmentin 875-125]    


 


DULoxetine HCL [Cymbalta] 30 mg PO DAILY 09/12/24 09/12/24 History


 


Doxycycline Monohydrate 100 mg PO BID 09/12/24 09/12/24 History


 


EPINEPHrine (Auto Inject) [Epipen] 0.3 mg IM ONCE PRN 09/12/24 09/12/24 History


 


L.acidoph,Paracasei, B.lactis 1 cap PO HS 09/12/24 09/12/24 History





[Probiotic]    


 


Naloxone HCl [Narcan] 4 mg NASAL ONCE PRN 09/12/24 09/12/24 History


 


Nicotine 14Mg/24Hr Patch [Habitrol 1 patch TRANSDERM DAILY 09/12/24 09/12/24 

History





14Mg/24Hr Patch]    


 


QUEtiapine [SEROquel] 50 mg PO HS 09/12/24 09/12/24 History


 


Tamsulosin HCl [Flomax] 0.4 mg PO DAILY 09/12/24 09/12/24 History


 


cefTRIAXone SODIUM [Ceftriaxone] 2 gm IV Q24H 09/12/24 09/12/24 History


 


hydroCHLOROthiazide [Hydrodiuril] 25 mg PO DAILY 09/12/24 09/12/24 History


 


metFORMIN HCL [Glucophage] 1,000 mg PO BID 09/12/24 09/12/24 History


 


oxyCODONE HCL [oxyCODONE HCL (IR)] 5 mg PO Q6H PRN 09/12/24 09/12/24 History








                                    Allergies











Allergy/AdvReac Type Severity Reaction Status Date / Time


 


bee venom protein (honey bee) Allergy Intermediate Swelling Verified 09/12/24 

09:04














Physical Examination


Exam of the right elbow reveals some fluctuance at the olecranon bursa.  There 

is mild erythema.  There is a healing abrasion noted to the elbow.  There is no 

redness or swelling into the forearm, wrist or hand.  He has full extension and 

flexion past 90 of the elbow.  He has full forearm rotation.  Neurovascular 

status to the upper extremity is intact.








Results





- Labs


Labs: 


                  Abnormal Lab Results - Last 24 Hours (Table)











  09/11/24 09/11/24 09/11/24 Range/Units





  20:49 20:49 20:49 


 


WBC  12.1 H    (3.8-10.6)  k/uL


 


Neutrophils #  9.1 H    (1.3-7.7)  k/uL


 


Sodium   133 L   (137-145)  mmol/L


 


Glucose   285 H   (74-99)  mg/dL


 


POC Glucose (mg/dL)     ()  mg/dL


 


Plasma Lactic Acid Peter    2.9 H*  (0.7-2.0)  mmol/L


 


C-Reactive Protein   4.4 H   (<1.0)  mg/dL














  09/12/24 Range/Units





  01:27 


 


WBC   (3.8-10.6)  k/uL


 


Neutrophils #   (1.3-7.7)  k/uL


 


Sodium   (137-145)  mmol/L


 


Glucose   (74-99)  mg/dL


 


POC Glucose (mg/dL)  171 H  ()  mg/dL


 


Plasma Lactic Acid Peter   (0.7-2.0)  mmol/L


 


C-Reactive Protein   (<1.0)  mg/dL








                                      H & H











  09/11/24 Range/Units





  20:49 


 


Hgb  13.5  (13.0-17.5)  gm/dL


 


Hct  43.6  (39.0-53.0)  %











Result Diagrams: 


                                 09/11/24 20:49





                                 09/11/24 20:49





Assessment and Plan


(1) Olecranon bursitis, right elbow


Current Visit: Yes   Status: Acute   Code(s): M70.21 - OLECRANON BURSITIS, RIGHT

ELBOW   SNOMED Code(s): 018650257949912


   





(2) Cellulitis of right upper extremity


Current Visit: Yes   Status: Acute   Code(s): L03.113 - CELLULITIS OF RIGHT 

UPPER LIMB   SNOMED Code(s): 93710025818020060


   


Plan: 


The clinical findings are discussed with the patient.  He is to continue IV 

antibiotics and warm compresses to the right elbow.  His symptoms are improving 

on IV antibiotics.  We will hold off on any surgical intervention at this time. 

We will continue to follow peripherally.

## 2024-09-12 NOTE — CT
EXAMINATION TYPE: CT brain wo con

 

DATE OF EXAM: 9/12/2024

 

COMPARISON: None

 

HISTORY: fell

 

CT DLP: 1029 mGycm

 

Unenhanced CT of the brain was performed.  

 

The ventricles, basal cisterns and sulci overlying the cerebral convexities demonstrate moderate enla
rgement. 

 

There is no evidence for intracranial hemorrhage or sulcal effacement.  

 

There is decreased attenuation about the periventricular white matter and deep white matter of both c
erebral hemispheres, compatible with chronic small vessel ischemia. Differential diagnosis does inclu
de demyelination. 

 

No mass effects are seen.No midline shift.

 

Osseous calvarium is intact.  

 

If symptoms persist consider MRI.  

 

IMPRESSION:

 

1. Age related atrophic and chronic small vessel ischemic change without acute intracranial process s
een at this time.
<<----- Click to add NO significant Past Surgical History

## 2024-09-13 LAB
ALBUMIN SERPL-MCNC: 4.1 G/DL (ref 3.8–4.9)
ALBUMIN/GLOB SERPL: 1.32 RATIO (ref 1.6–3.17)
ALP SERPL-CCNC: 129 U/L (ref 41–126)
ALT SERPL-CCNC: 25 U/L (ref 10–49)
ANION GAP SERPL CALC-SCNC: 12.4 MMOL/L (ref 4–12)
AST SERPL-CCNC: 31 U/L (ref 14–35)
BASOPHILS # BLD AUTO: 0.05 X 10*3/UL (ref 0–0.1)
BASOPHILS NFR BLD AUTO: 0.5 %
BUN SERPL-SCNC: 9.7 MG/DL (ref 9–27)
BUN/CREAT SERPL: 13.86 RATIO (ref 12–20)
CALCIUM SPEC-MCNC: 10 MG/DL (ref 8.7–10.3)
CHLORIDE SERPL-SCNC: 103 MMOL/L (ref 96–109)
CO2 SERPL-SCNC: 25.6 MMOL/L (ref 21.6–31.8)
EOSINOPHIL # BLD AUTO: 0.36 X 10*3/UL (ref 0.04–0.35)
EOSINOPHIL NFR BLD AUTO: 3.5 %
ERYTHROCYTE [DISTWIDTH] IN BLOOD BY AUTOMATED COUNT: 5.65 X 10*6/UL (ref 4.4–5.6)
ERYTHROCYTE [DISTWIDTH] IN BLOOD: 14.6 % (ref 11.5–14.5)
GLOBULIN SER CALC-MCNC: 3.1 G/DL (ref 1.6–3.3)
GLUCOSE BLD-MCNC: 170 MG/DL (ref 70–110)
GLUCOSE BLD-MCNC: 187 MG/DL (ref 70–110)
GLUCOSE BLD-MCNC: 261 MG/DL (ref 70–110)
GLUCOSE SERPL-MCNC: 193 MG/DL (ref 70–110)
HCT VFR BLD AUTO: 46.7 % (ref 39.6–50)
HGB BLD-MCNC: 14.9 G/DL (ref 13–17)
IMM GRANULOCYTES BLD QL AUTO: 0.3 %
LYMPHOCYTES # SPEC AUTO: 2.04 X 10*3/UL (ref 0.9–5)
LYMPHOCYTES NFR SPEC AUTO: 19.7 %
MCH RBC QN AUTO: 26.4 PG (ref 27–32)
MCHC RBC AUTO-ENTMCNC: 31.9 G/DL (ref 32–37)
MCV RBC AUTO: 82.7 FL (ref 80–97)
MONOCYTES # BLD AUTO: 0.74 X 10*3/UL (ref 0.2–1)
MONOCYTES NFR BLD AUTO: 7.2 %
NEUTROPHILS # BLD AUTO: 7.11 X 10*3/UL (ref 1.8–7.7)
NEUTROPHILS NFR BLD AUTO: 68.8 %
NRBC BLD AUTO-RTO: 0 X 10*3/UL (ref 0–0.01)
PLATELET # BLD AUTO: 321 X 10*3/UL (ref 140–440)
POTASSIUM SERPL-SCNC: 4.1 MMOL/L (ref 3.5–5.5)
PROT SERPL-MCNC: 7.2 G/DL (ref 6.2–8.2)
SODIUM SERPL-SCNC: 141 MMOL/L (ref 135–145)
WBC # BLD AUTO: 10.33 X 10*3/UL (ref 4.5–10)

## 2024-09-13 RX ADMIN — NYSTATIN SCH APPLIC: 100000 POWDER TOPICAL at 18:03

## 2024-09-13 RX ADMIN — DAPAGLIFLOZIN SCH MG: 5 TABLET, FILM COATED ORAL at 08:40

## 2024-09-13 RX ADMIN — PANTOPRAZOLE SODIUM SCH MG: 40 TABLET, DELAYED RELEASE ORAL at 06:10

## 2024-09-13 RX ADMIN — INSULIN ASPART SCH UNIT: 100 INJECTION, SOLUTION INTRAVENOUS; SUBCUTANEOUS at 18:03

## 2024-09-13 NOTE — P.PN
Pt presents to ED with cough, sore throat, tactile fevers, congestion for 4-5 days. Pt reports chest pain with breathing and coughing for 2-3 days. Pt denies dizziness, N/V.     Motrin last given at 1930 yesterday.   Subjective


Progress Note Date: 09/13/24


Principal diagnosis: 


Reason for follow-up is right elbow olecranon bursitis








Patient is a 68-year-old  male with a past medical history significant 

for diabetes mellitus hypertension hyperlipidemia reflux osteoarthritis prostate

disorder in this patient apparently was recently admitted at [facility] from 9/2/2024 till 9/5/2024 with the patient has been treated for UTI 

he was also diagnosed with a right elbow olecranon bursitis status post bedside 

aspiration by the hospitalist, has not been readmitted to hospital with 

worsening right olecranon bursitis failing outpatient or antibiotic therapy.


On today's evaluation that is 09/13/2024,the patient remains to be afebrile, 

patient is on room air not requiring supplemental oxygen and denies any 

shortness of breath no chest pain or cough.Patient denies having any nausea or 

vomiting, no abdominal pain and no diarrhea, the patient pain to the right elbow

has decreased in intensity.


Patient white count is 10.33, creatinine 0.7 blood cultures pending





Objective





- Vital Signs


Vital signs: 


                                   Vital Signs











Temp  98.4 F   09/13/24 07:00


 


Pulse  81   09/13/24 07:00


 


Resp  18   09/13/24 07:00


 


BP  162/77   09/13/24 07:00


 


Pulse Ox  98   09/13/24 07:00


 


FiO2      








                                 Intake & Output











 09/12/24 09/13/24 09/13/24





 18:59 06:59 18:59


 


Intake Total 577 480 240


 


Output Total 500  


 


Balance 77 480 240


 


Intake:   


 


  Oral 577 480 240


 


Output:   


 


  Urine 500  


 


Other:   


 


  Voiding Method Urinal Urinal Diaper





 Diaper Diaper 


 


  # Voids 1 3 


 


  # Bowel Movements 1 0 














- Exam





GENERAL DESCRIPTION: An elderly male lying in bed in no distress





RESPIRATORY SYSTEM: Unlabored breathing , decreased breath sounds at bases





HEART: S1 S2 regular rate and rhythm ,





ABDOMEN: Soft , no tenderness





EXTREMITIES: Right elbow swelling redness slightly decreased





- Labs


CBC & Chem 7: 


                                 09/13/24 07:52





                                 09/13/24 07:52


Labs: 


                  Abnormal Lab Results - Last 24 Hours (Table)











  09/13/24 09/13/24 09/13/24 Range/Units





  07:52 07:52 08:17 


 


WBC  10.33 H    (4.50-10.00)  X 10*3/uL


 


RBC  5.65 H    (4.40-5.60)  X 10*6/uL


 


MCH  26.4 L    (27.0-32.0)  pg


 


MCHC  31.9 L    (32.0-37.0)  g/dL


 


RDW  14.6 H    (11.5-14.5)  %


 


Eosinophils #  0.36 H    (0.04-0.35)  X 10*3/uL


 


Anion Gap   12.40 H   (4.00-12.00)  mmol/L


 


Glucose   193 H   ()  mg/dL


 


POC Glucose (mg/dL)    170 H  ()  mg/dL


 


Alkaline Phosphatase   129 H   ()  U/L


 


Albumin/Globulin Ratio   1.32 L   (1.60-3.17)  Ratio








                      Microbiology - Last 24 Hours (Table)











 09/11/24 22:32 Blood Culture - Preliminary





 Blood 


 


 09/11/24 22:17 Blood Culture - Preliminary





 Blood 














Assessment and Plan


(1) Cellulitis of right upper extremity


Current Visit: Yes   Status: Acute   Code(s): L03.113 - CELLULITIS OF RIGHT 

UPPER LIMB   SNOMED Code(s): 00374151518392642


   





(2) Olecranon bursitis, right elbow


Current Visit: Yes   Status: Acute   Code(s): M70.21 - OLECRANON BURSITIS, RIGHT

ELBOW   SNOMED Code(s): 274121799826977


   





(3) Failure of outpatient treatment


Current Visit: Yes   Status: Acute   Code(s): Z78.9 - OTHER SPECIFIED HEALTH 

STATUS   SNOMED Code(s): 561334856


   





(4) MSSA (methicillin susceptible Staphylococcus aureus) infection


Current Visit: Yes   Status: Acute   Code(s): A49.01 - METHICILLIN SUSCEP STAPH 

INFECTION, UNSP SITE   SNOMED Code(s): 922408627


   


Plan: 





1patient presented to hospital with right elbow pain swelling redness which is 

likely related to right elbow olecranon bursitis that has been drained at [facility] during his admission from 9/2 through 9/5/2024 I was able to 

review the culture and they have been growing MSSA patient seem to have failed 

outpatient oral antibiotic therapy


2-patient did have some improvement with cefazolin 2 g every 8 hour to continue 

may need IV antibiotic if no improvement over the weekend and no surgical 

drainage


Wife at the bedside question concern answered


Dictation was produced using dragon dictation software. please excuse any 

grammatical, word or spelling errors. 








Time with Patient: Less than 30

## 2024-09-13 NOTE — P.PN
Subjective


Progress Note Date: 09/13/24


Principal diagnosis: 


Right elbow pain.


Olecranon bursitis right elbow.








This is a 68-year-old male with history of injury to his right elbow a couple of

weeks ago when he fell.  He resides in an extended care facility and states that

he fell on the elbow and had a large abrasion.  He then developed some swelling 

to the elbow.  The elbow was aspirated in the emergency department at [facility] Last week the patient was then admitted here at [facility]. We are 

consulted for orthopedic evaluation of the right elbow.  He had a temp of 100.1 

last evening.  I blood cell count was elevated at 12.1.





9/13/2024:


The patient is evaluated at bedside today with Dr. Amato.  The patient has no 

new complaints or concerns today.  He states that his pain is improving.  He fee

ls that his swelling is improving to the right upper extremity.  Vital signs are

stable.  He is currently afebrile.  White blood cell count is coming down.








Objective





- Vital Signs


Vital signs: 


                                   Vital Signs











Temp  98.4 F   09/13/24 07:00


 


Pulse  81   09/13/24 07:00


 


Resp  18   09/13/24 07:00


 


BP  162/77   09/13/24 07:00


 


Pulse Ox  98   09/13/24 07:00


 


FiO2      








                                 Intake & Output











 09/12/24 09/13/24 09/13/24





 18:59 06:59 18:59


 


Intake Total 577 480 240


 


Output Total 500  


 


Balance 77 480 240


 


Intake:   


 


  Oral 577 480 240


 


Output:   


 


  Urine 500  


 


Other:   


 


  Voiding Method Urinal Urinal Diaper





 Diaper Diaper 


 


  # Voids 1 3 


 


  # Bowel Movements 1 0 














- Exam


This is a pleasant 68-year-old male in no acute distress.  He is alert and 

oriented x 3.  Exam of the right upper extremity reveals that the swelling to 

the elbow and forearm are improved.  He has no swelling to the wrist or hand.  

He has full extension and flexion of the elbow as well as full forearm rotation 

without difficulty or pain.  There is slight fluctuance to the olecranon bursa. 

There is minimal erythema.  The erythema has subsided since yesterday.








- Labs


CBC & Chem 7: 


                                 09/13/24 07:52





                                 09/13/24 07:52


Labs: 


                  Abnormal Lab Results - Last 24 Hours (Table)











  09/13/24 09/13/24 09/13/24 Range/Units





  07:52 07:52 08:17 


 


WBC  10.33 H    (4.50-10.00)  X 10*3/uL


 


RBC  5.65 H    (4.40-5.60)  X 10*6/uL


 


MCH  26.4 L    (27.0-32.0)  pg


 


MCHC  31.9 L    (32.0-37.0)  g/dL


 


RDW  14.6 H    (11.5-14.5)  %


 


Eosinophils #  0.36 H    (0.04-0.35)  X 10*3/uL


 


Anion Gap   12.40 H   (4.00-12.00)  mmol/L


 


Glucose   193 H   ()  mg/dL


 


POC Glucose (mg/dL)    170 H  ()  mg/dL


 


Alkaline Phosphatase   129 H   ()  U/L


 


Albumin/Globulin Ratio   1.32 L   (1.60-3.17)  Ratio








                      Microbiology - Last 24 Hours (Table)











 09/11/24 22:32 Blood Culture - Preliminary





 Blood 


 


 09/11/24 22:17 Blood Culture - Preliminary





 Blood 














Assessment and Plan


(1) Olecranon bursitis, right elbow


Current Visit: Yes   Status: Acute   Code(s): M70.21 - OLECRANON BURSITIS, RIGHT

ELBOW   SNOMED Code(s): 313378929377359


   





(2) Cellulitis of right upper extremity


Current Visit: Yes   Status: Acute   Code(s): L03.113 - CELLULITIS OF RIGHT 

UPPER LIMB   SNOMED Code(s): 93441064032273841


   


Plan: 


The clinical findings are discussed with the patient.  He is to continue IV 

antibiotics and warm compresses to the right elbow.  His symptoms are improving 

on IV antibiotics.  Dr. Amato discussed with the patient that there is no 

surgical indication at this time since he is improving.  We discussed the risks 

of surgical debridement including prolonged drainage of the wound and other 

wound complications.  The patient is to follow-up in our office 1 week after 

discharge.

## 2024-09-13 NOTE — P.PN
Subjective


Progress Note Date: 09/13/24





Patient is a 68-year-old male who presents with right elbow swelling and pain.  

He states that he has been having difficulty with balance/weakness and recently 

fell.  Since his fall he has had right elbow swelling and erythemahe went to 

[facility] (according to his wife) the other day where they drained it and 

prescribed outpatient antibiotics.  Since then the elbow has not improved so he 

was admitted here.  He rates the pain a 6/10.  He denies chest pain, shortness 

of breath, abdominal pain.  He endorses nonbloody diarrhea since beginning the 

antibiotics from [facility].





WBCs 12.1, lactic acid 2.9, CRP 4.4


Afebrile, hypertensive.





09/13.  Patient seen and examined lying in bed.  Per infectious disease consult:

Patient had right elbow olecranon bursitis drained bedside at [facility]; 

cultures grew MSSA but patient seems to have failed outpatient oral antibiotic 

therapy so he was started on IV cefazolin 2 g every 8 hours.  Patient denies 

chest pain, shortness of breath.  Continues to endorse full range of motion.  

Patient remains afebrile.  WBCs 10.33





ROS reviewed.  Pertinent positives and negatives discussed above, a complete 

review of systems was performed and all the other systems were negative.





Vital signs are stable.


General: No acute distress.


HEENT:  Head exam is unremarkable. 


Lungs: Bilateral breath sounds present; no rhonchi, wheezes, or rales.


Heart: Rate and rhythm are regular. 


Abdomen: Nontender.


Extremities: No edema present.





Assessment/Plan:


 Cellulitis of the right elbow


 Right olecranon bursitis


Continue IV cefazolin 2 g every 8 hours


Monitor CBC/BMP


Pain control





 Hypertension


Continue hydralazine


Continue hydrochlorothiazide


Continue Cozaar





 Diabetes mellitus type 2


Continue Farxiga


Insulin sliding scale





 Hyperlipidemia


 Continue Lipitor





 Parkinson's


Continue carbidopa/levodopa discharge


Continue Cymbalta


Continue Seroquel


Continue Lexapro





Dr. Enmanuel MD


I have performed a history and physical examination and medical decision making 

of this patient, discussed the same with the the resident, and agree with the 

assessment and plan as written.  I performed brief physical exam.








Objective





- Vital Signs


Vital signs: 


                                   Vital Signs











Temp  97.7 F   09/13/24 02:00


 


Pulse  75   09/13/24 02:00


 


Resp  18   09/13/24 02:00


 


BP  123/68   09/13/24 02:00


 


Pulse Ox  96   09/13/24 02:00


 


FiO2      








                                 Intake & Output











 09/12/24 09/13/24 09/13/24





 18:59 06:59 18:59


 


Intake Total 577 480 


 


Output Total 500  


 


Balance 77 480 


 


Intake:   


 


  Oral 577 480 


 


Output:   


 


  Urine 500  


 


Other:   


 


  Voiding Method Urinal Urinal 





 Diaper Diaper 


 


  # Voids 1 3 


 


  # Bowel Movements 1 0 














- Labs


CBC & Chem 7: 


                                 09/14/24 02:52





                                 09/14/24 02:52


Labs: 


                      Microbiology - Last 24 Hours (Table)











 09/11/24 22:32 Blood Culture - Preliminary





 Blood 


 


 09/11/24 22:17 Blood Culture - Preliminary





 Blood

## 2024-09-14 LAB
ALBUMIN SERPL-MCNC: 3.5 G/DL (ref 3.8–4.9)
ALBUMIN/GLOB SERPL: 1.4 RATIO (ref 1.6–3.17)
ALP SERPL-CCNC: 117 U/L (ref 41–126)
ALT SERPL-CCNC: 24 U/L (ref 10–49)
ANION GAP SERPL CALC-SCNC: 10.6 MMOL/L (ref 4–12)
AST SERPL-CCNC: 33 U/L (ref 14–35)
BASOPHILS # BLD AUTO: 0.06 X 10*3/UL (ref 0–0.1)
BASOPHILS NFR BLD AUTO: 0.6 %
BUN SERPL-SCNC: 13.7 MG/DL (ref 9–27)
BUN/CREAT SERPL: 19.57 RATIO (ref 12–20)
CALCIUM SPEC-MCNC: 9.2 MG/DL (ref 8.7–10.3)
CHLORIDE SERPL-SCNC: 104 MMOL/L (ref 96–109)
CO2 SERPL-SCNC: 23.4 MMOL/L (ref 21.6–31.8)
EOSINOPHIL # BLD AUTO: 0.34 X 10*3/UL (ref 0.04–0.35)
EOSINOPHIL NFR BLD AUTO: 3.7 %
ERYTHROCYTE [DISTWIDTH] IN BLOOD BY AUTOMATED COUNT: 4.85 X 10*6/UL (ref 4.4–5.6)
ERYTHROCYTE [DISTWIDTH] IN BLOOD: 14.8 % (ref 11.5–14.5)
GLOBULIN SER CALC-MCNC: 2.5 G/DL (ref 1.6–3.3)
GLUCOSE BLD-MCNC: 167 MG/DL (ref 70–110)
GLUCOSE BLD-MCNC: 211 MG/DL (ref 70–110)
GLUCOSE BLD-MCNC: 223 MG/DL (ref 70–110)
GLUCOSE BLD-MCNC: 354 MG/DL (ref 70–110)
GLUCOSE SERPL-MCNC: 249 MG/DL (ref 70–110)
HCT VFR BLD AUTO: 40.8 % (ref 39.6–50)
HGB BLD-MCNC: 13 G/DL (ref 13–17)
IMM GRANULOCYTES BLD QL AUTO: 0.3 %
LYMPHOCYTES # SPEC AUTO: 2.66 X 10*3/UL (ref 0.9–5)
LYMPHOCYTES NFR SPEC AUTO: 28.6 %
MCH RBC QN AUTO: 26.8 PG (ref 27–32)
MCHC RBC AUTO-ENTMCNC: 31.9 G/DL (ref 32–37)
MCV RBC AUTO: 84.1 FL (ref 80–97)
MONOCYTES # BLD AUTO: 0.79 X 10*3/UL (ref 0.2–1)
MONOCYTES NFR BLD AUTO: 8.5 %
NEUTROPHILS # BLD AUTO: 5.41 X 10*3/UL (ref 1.8–7.7)
NEUTROPHILS NFR BLD AUTO: 58.3 %
NRBC BLD AUTO-RTO: 0 X 10*3/UL (ref 0–0.01)
PLATELET # BLD AUTO: 236 X 10*3/UL (ref 140–440)
POTASSIUM SERPL-SCNC: 4.2 MMOL/L (ref 3.5–5.5)
PROT SERPL-MCNC: 6 G/DL (ref 6.2–8.2)
SODIUM SERPL-SCNC: 138 MMOL/L (ref 135–145)
WBC # BLD AUTO: 9.29 X 10*3/UL (ref 4.5–10)

## 2024-09-14 NOTE — P.PN
Subjective


Progress Note Date: 09/14/24


Principal diagnosis: 


Reason for follow-up is right elbow olecranon bursitis








Patient is a 68-year-old  male with a past medical history significant 

for diabetes mellitus hypertension hyperlipidemia reflux osteoarthritis prostate

disorder in this patient apparently was recently admitted at [facility] from 9/2/2024 till 9/5/2024 with the patient has been treated for UTI 

he was also diagnosed with a right elbow olecranon bursitis status post bedside 

aspiration by the hospitalist, has not been readmitted to hospital with 

worsening right olecranon bursitis failing outpatient or antibiotic therapy.


On today's evaluation that is 09/14/2024, the patient continues to be afebrile, 

the patient is on room air and breathing comfortably, the Pt denies having any 

chest pain or cough, the patient denies having any abdominal pain no vomiting or

any diarrhea, still complaining of pain to the right elbow but no worsening.


Patient white count is normalized to 9.29 creatinine 0.7 blood culture negative





Objective





- Vital Signs


Vital signs: 


                                   Vital Signs











Temp  98.7 F   09/14/24 07:00


 


Pulse  68   09/14/24 07:00


 


Resp  16   09/14/24 07:00


 


BP  174/84   09/14/24 07:00


 


Pulse Ox  95   09/14/24 07:00


 


FiO2      








                                 Intake & Output











 09/13/24 09/14/24 09/14/24





 18:59 06:59 18:59


 


Intake Total 240 118 118


 


Output Total 950 1500 


 


Balance -710 1382 118


 


Intake:   


 


  Oral 240 118 118


 


Output:   


 


  Urine 950 1500 


 


Other:   


 


  Voiding Method Diaper Urinal Urinal





  Diaper Diaper


 


  # Voids  1 


 


  # Bowel Movements   1














- Exam





GENERAL DESCRIPTION: An elderly male lying in bed in no distress





RESPIRATORY SYSTEM: Unlabored breathing , decreased breath sounds at bases





HEART: S1 S2 regular rate and rhythm ,





ABDOMEN: Soft , no tenderness





EXTREMITIES: Right elbow swelling redness slightly decreased





- Labs


CBC & Chem 7: 


                                 09/14/24 02:52





                                 09/14/24 02:52


Labs: 


                  Abnormal Lab Results - Last 24 Hours (Table)











  09/13/24 09/13/24 09/14/24 Range/Units





  17:24 20:32 02:52 


 


MCH    26.8 L  (27.0-32.0)  pg


 


MCHC    31.9 L  (32.0-37.0)  g/dL


 


RDW    14.8 H  (11.5-14.5)  %


 


Glucose     ()  mg/dL


 


POC Glucose (mg/dL)  187 H  261 H   ()  mg/dL


 


Total Bilirubin     (0.3-1.2)  mg/dL


 


Total Protein     (6.2-8.2)  g/dL


 


Albumin     (3.8-4.9)  g/dL


 


Albumin/Globulin Ratio     (1.60-3.17)  Ratio














  09/14/24 09/14/24 09/14/24 Range/Units





  02:52 05:47 11:59 


 


MCH     (27.0-32.0)  pg


 


MCHC     (32.0-37.0)  g/dL


 


RDW     (11.5-14.5)  %


 


Glucose  249 H    ()  mg/dL


 


POC Glucose (mg/dL)   223 H  354 H  ()  mg/dL


 


Total Bilirubin  <0.2 L    (0.3-1.2)  mg/dL


 


Total Protein  6.0 L    (6.2-8.2)  g/dL


 


Albumin  3.5 L    (3.8-4.9)  g/dL


 


Albumin/Globulin Ratio  1.40 L    (1.60-3.17)  Ratio








                      Microbiology - Last 24 Hours (Table)











 09/11/24 22:32 Blood Culture - Preliminary





 Blood 


 


 09/11/24 22:17 Blood Culture - Preliminary





 Blood 














Assessment and Plan


(1) Cellulitis of right upper extremity


Current Visit: Yes   Status: Acute   Code(s): L03.113 - CELLULITIS OF RIGHT 

UPPER LIMB   SNOMED Code(s): 54818908594448150


   





(2) Olecranon bursitis, right elbow


Current Visit: Yes   Status: Acute   Code(s): M70.21 - OLECRANON BURSITIS, RIGHT

ELBOW   SNOMED Code(s): 206989560221897


   





(3) Failure of outpatient treatment


Current Visit: Yes   Status: Acute   Code(s): Z78.9 - OTHER SPECIFIED HEALTH 

STATUS   SNOMED Code(s): 251255407


   





(4) MSSA (methicillin susceptible Staphylococcus aureus) infection


Current Visit: Yes   Status: Acute   Code(s): A49.01 - METHICILLIN SUSCEP STAPH 

INFECTION, UNSP SITE   SNOMED Code(s): 012160891


   


Plan: 





1patient presented to hospital with right elbow pain swelling redness which is 

likely related to right elbow olecranon bursitis that has been drained at [facility] during his admission from 9/2 through 9/5/2024 I was able to 

review the culture and they have been growing MSSA patient seem to have failed 

outpatient oral antibiotic therapy


2-patient will likely need midline outpatient IV antibiotic therapy keeping in 

mind slow improvement and may benefit from surgical I&D Ortho is following the 

patient continue with the cefazolin


Dictation was produced using dragon dictation software. please excuse any 

grammatical, word or spelling errors. 








Time with Patient: Less than 30

## 2024-09-14 NOTE — P.PN
Subjective


Progress Note Date: 09/14/24





Patient is a 68-year-old male who presents with right elbow swelling and pain.  

He states that he has been having difficulty with balance/weakness and recently 

fell.  Since his fall he has had right elbow swelling and erythemahe went to 

[facility] (according to his wife) the other day where they drained it and 

prescribed outpatient antibiotics.  Since then the elbow has not improved so he 

was admitted here.  He rates the pain a 6/10.  He denies chest pain, shortness 

of breath, abdominal pain.  He endorses nonbloody diarrhea since beginning the 

antibiotics from [facility].





WBCs 12.1, lactic acid 2.9, CRP 4.4


Afebrile, hypertensive.





09/13.  Patient seen and examined lying in bed.  Per infectious disease consult:

Patient had right elbow olecranon bursitis drained bedside at [facility]; 

cultures grew MSSA but patient seems to have failed outpatient oral antibiotic 

therapy so he was started on IV cefazolin 2 g every 8 hours.  Patient denies 

chest pain, shortness of breath.  Continues to endorse full range of motion.  

Patient remains afebrile.  WBCs 10.33





09/14.  Patient seen lying comfortably in bed.  He remains on IV cefazolin 2 g 

every 8 hours.  He continues to endorse full range of motion.  He is afebrile.  

Reports improved diarrhea.  C.difficile is negative.  WBCs 9.2.





ROS reviewed.  Pertinent positives and negatives discussed above, a complete 

review of systems was performed and all the other systems were negative.





Vital signs are stable.


General: No acute distress.


HEENT:  Head exam is unremarkable. 


Lungs: Bilateral breath sounds present; no rhonchi, wheezes, or rales.


Heart: Rate and rhythm are regular. 


Abdomen: Nontender.


Extremities: No edema present.





Assessment/Plan:


 Cellulitis of the right elbow


 Right olecranon bursitis


Continue IV cefazolin 2 g every 8 hours


Pending further infectious disease recommendations


Pain control





 Hypertension


Continue hydralazine


Continue hydrochlorothiazide


Continue Cozaar





 Diabetes mellitus type 2


Continue Farxiga


Insulin sliding scale





 Hyperlipidemia


 Continue Lipitor





 Parkinson's


Continue carbidopa/levodopa discharge


Continue Cymbalta


Continue Seroquel


Continue Lexapro





Dr. Enmanuel MD


I have performed a history and physical examination and medical decision making 

of this patient, discussed the same with the the resident, and agree with the 

assessment and plan as written.  I performed brief physical exam.








Objective





- Vital Signs


Vital signs: 


                                   Vital Signs











Temp  98.7 F   09/14/24 07:00


 


Pulse  68   09/14/24 07:00


 


Resp  16   09/14/24 07:00


 


BP  174/84   09/14/24 07:00


 


Pulse Ox  95   09/14/24 07:00


 


FiO2      








                                 Intake & Output











 09/13/24 09/14/24 09/14/24





 18:59 06:59 18:59


 


Intake Total 240 118 118


 


Output Total 950 1500 


 


Balance -710 -1382 118


 


Intake:   


 


  Oral 240 118 118


 


Output:   


 


  Urine 950 1500 


 


Other:   


 


  Voiding Method Diaper Urinal Urinal





  Diaper Diaper


 


  # Voids  1 


 


  # Bowel Movements   1














- Labs


CBC & Chem 7: 


                                 09/14/24 02:52





                                 09/14/24 02:52


Labs: 


                  Abnormal Lab Results - Last 24 Hours (Table)











  09/13/24 09/13/24 09/13/24 Range/Units





  07:52 07:52 17:24 


 


WBC  10.33 H    (4.50-10.00)  X 10*3/uL


 


RBC  5.65 H    (4.40-5.60)  X 10*6/uL


 


MCH  26.4 L    (27.0-32.0)  pg


 


MCHC  31.9 L    (32.0-37.0)  g/dL


 


RDW  14.6 H    (11.5-14.5)  %


 


Eosinophils #  0.36 H    (0.04-0.35)  X 10*3/uL


 


Anion Gap   12.40 H   (4.00-12.00)  mmol/L


 


Glucose   193 H   ()  mg/dL


 


POC Glucose (mg/dL)    187 H  ()  mg/dL


 


Alkaline Phosphatase   129 H   ()  U/L


 


Albumin/Globulin Ratio   1.32 L   (1.60-3.17)  Ratio














  09/13/24 09/14/24 09/14/24 Range/Units





  20:32 02:52 05:47 


 


WBC     (4.50-10.00)  X 10*3/uL


 


RBC     (4.40-5.60)  X 10*6/uL


 


MCH   26.8 L   (27.0-32.0)  pg


 


MCHC   31.9 L   (32.0-37.0)  g/dL


 


RDW   14.8 H   (11.5-14.5)  %


 


Eosinophils #     (0.04-0.35)  X 10*3/uL


 


Anion Gap     (4.00-12.00)  mmol/L


 


Glucose     ()  mg/dL


 


POC Glucose (mg/dL)  261 H   223 H  ()  mg/dL


 


Alkaline Phosphatase     ()  U/L


 


Albumin/Globulin Ratio     (1.60-3.17)  Ratio








                      Microbiology - Last 24 Hours (Table)











 09/11/24 22:32 Blood Culture - Preliminary





 Blood 


 


 09/11/24 22:17 Blood Culture - Preliminary





 Blood

## 2024-09-15 LAB
GLUCOSE BLD-MCNC: 187 MG/DL (ref 70–110)
GLUCOSE BLD-MCNC: 243 MG/DL (ref 70–110)
GLUCOSE BLD-MCNC: 301 MG/DL (ref 70–110)
GLUCOSE BLD-MCNC: 301 MG/DL (ref 70–110)

## 2024-09-15 RX ADMIN — INSULIN DETEMIR SCH UNIT: 100 INJECTION, SOLUTION SUBCUTANEOUS at 20:15

## 2024-09-15 RX ADMIN — HYDROCORTISONE PRN APPLIC: 1 OINTMENT TOPICAL at 16:46

## 2024-09-15 NOTE — P.PN
Subjective


Progress Note Date: 09/15/24


Principal diagnosis: 


Reason for follow-up is right elbow olecranon bursitis








Patient is a 68-year-old  male with a past medical history significant 

for diabetes mellitus hypertension hyperlipidemia reflux osteoarthritis prostate

disorder in this patient apparently was recently admitted at [facility] from 9/2/2024 till 9/5/2024 with the patient has been treated for UTI 

he was also diagnosed with a right elbow olecranon bursitis status post bedside 

aspiration by the hospitalist, has not been readmitted to hospital with 

worsening right olecranon bursitis failing outpatient or antibiotic therapy.


On today's evaluation that is 09/15/2024, Patient is afebrile patient is 

currently on room air and denies having any shortness of breath, the patient 

denies any chest pain or cough, the patient denies any nausea vomiting did not 

have any abdominal pain and no diarrhea, still complaining of pain to the right 

elbow area.


Patient white count is 9.29 as of yesterday no CBC was done today blood cultures

are pending





Objective





- Vital Signs


Vital signs: 


                                   Vital Signs











Temp  99 F   09/15/24 14:15


 


Pulse  86   09/15/24 14:15


 


Resp  16   09/15/24 14:15


 


BP  137/73   09/15/24 14:15


 


Pulse Ox  94 L  09/15/24 14:15


 


FiO2      








                                 Intake & Output











 09/14/24 09/15/24 09/15/24





 18:59 06:59 18:59


 


Intake Total 354  462


 


Output Total 850  250


 


Balance -496  212


 


Intake:   


 


  Oral 354  462


 


Output:   


 


  Urine 850  250


 


Other:   


 


  Voiding Method Diaper Diaper Diaper





 External Catheter External Catheter External Catheter


 


  # Voids  1 1


 


  # Bowel Movements 1 0 2














- Exam





GENERAL DESCRIPTION: An elderly male lying in bed in no distress





RESPIRATORY SYSTEM: Unlabored breathing , decreased breath sounds at bases





HEART: S1 S2 regular rate and rhythm ,





ABDOMEN: Soft , no tenderness





EXTREMITIES: Right elbow swelling redness slightly decreased





- Labs


CBC & Chem 7: 


                                 09/14/24 02:52





                                 09/14/24 02:52


Labs: 


                  Abnormal Lab Results - Last 24 Hours (Table)











  09/14/24 09/14/24 09/15/24 Range/Units





  16:58 20:37 06:23 


 


POC Glucose (mg/dL)  167 H  211 H  187 H  ()  mg/dL














  09/15/24 Range/Units





  11:10 


 


POC Glucose (mg/dL)  301 H  ()  mg/dL








                      Microbiology - Last 24 Hours (Table)











 09/11/24 22:32 Blood Culture - Preliminary





 Blood 


 


 09/11/24 22:17 Blood Culture - Preliminary





 Blood 














Assessment and Plan


(1) Cellulitis of right upper extremity


Current Visit: Yes   Status: Acute   Code(s): L03.113 - CELLULITIS OF RIGHT 

UPPER LIMB   SNOMED Code(s): 61685367835622577


   





(2) Olecranon bursitis, right elbow


Current Visit: Yes   Status: Acute   Code(s): M70.21 - OLECRANON BURSITIS, RIGHT

ELBOW   SNOMED Code(s): 769498170223108


   





(3) Failure of outpatient treatment


Current Visit: Yes   Status: Acute   Code(s): Z78.9 - OTHER SPECIFIED HEALTH 

STATUS   SNOMED Code(s): 684862434


   





(4) MSSA (methicillin susceptible Staphylococcus aureus) infection


Current Visit: Yes   Status: Acute   Code(s): A49.01 - METHICILLIN SUSCEP STAPH 

INFECTION, UNSP SITE   SNOMED Code(s): 606948088


   


Plan: 





1patient presented to hospital with right elbow pain swelling redness which is 

likely related to right elbow olecranon bursitis that has been drained at [facility] during his admission from 9/2 through 9/5/2024 I was able to 

review the culture and they have been growing MSSA patient seem to have failed 

outpatient oral antibiotic therapy


2-patient did have slow improvement especially with no drainage of this 

infection patient will need midline and outpatient IV cefazolin plan of care 

discussed with the NP for admitting team 


Dictation was produced using dragon dictation software. please excuse any 

grammatical, word or spelling errors. 








Time with Patient: Less than 30

## 2024-09-15 NOTE — P.PN
Subjective


Progress Note Date: 09/15/24





Patient is a 68-year-old male who presents with right elbow swelling and pain.  

He states that he has been having difficulty with balance/weakness and recently 

fell.  Since his fall he has had right elbow swelling and erythemahe went to 

[facility] (according to his wife) the other day where they drained it and 

prescribed outpatient antibiotics.  Since then the elbow has not improved so he 

was admitted here.  He rates the pain a 6/10.  He denies chest pain, shortness 

of breath, abdominal pain.  He endorses nonbloody diarrhea since beginning the 

antibiotics from [facility].





WBCs 12.1, lactic acid 2.9, CRP 4.4


Afebrile, hypertensive.





09/13.  Patient seen and examined lying in bed.  Per infectious disease consult:

Patient had right elbow olecranon bursitis drained bedside at [facility]; 

cultures grew MSSA but patient seems to have failed outpatient oral antibiotic 

therapy so he was started on IV cefazolin 2 g every 8 hours.  Patient denies 

chest pain, shortness of breath.  Continues to endorse full range of motion.  

Patient remains afebrile.  WBCs 10.33





09/14.  Patient seen lying comfortably in bed.  He remains on IV cefazolin 2 g 

every 8 hours.  He continues to endorse full range of motion.  He is afebrile.  

Reports improved diarrhea.  C.difficile is negative.  WBCs 9.2.





09/15/2024


Patient is evaluated in follow-up on the medical floor with family at the 

bedside.  He continues to report minimal to moderate pain to his elbow there 

continues to be some redness and swelling.  Orthopedics is not planning on doing

any I&D's at this time.  He remains on IV Kefzol.  He is noted to have a scabbed

over rash on the left upper thigh and abdominal area.





Review of Systems





Constitutional: Denied any fatigue denied any fever.


Cardio vascular: denied any chest pain, palpitations


Gastrointestinal: denied any nausea, vomiting, diarrhea


Pulmonary: Denied any shortness of breath cough


Neurologic denied any new focal deficits





All inpatient medications were reviewed and appropriate changes in these 

medications as dictated in the interval history and assessment and plan.








PHYSICAL EXAMINATION: 





GENERAL: The patient is alert and oriented x3, not in any acute distress. Well 

developed, well nourished. 


HEENT: Pupils are round and equally reacting to light. EOMI. No scleral icterus.

No conjunctival pallor. Normocephalic, atraumatic. No pharyngeal erythema. No 

thyromegaly. 


CARDIOVASCULAR: S1 and S2 present. No murmurs, rubs, or gallops. 


PULMONARY: Chest is clear to auscultation, no wheezing or crackles. 


ABDOMEN: Soft, nontender, nondistended, normoactive bowel sounds. No palpable 

organomegaly. 


MUSCULOSKELETAL: No joint swelling or deformity.


EXTREMITIES: No cyanosis, clubbing, or pedal edema. 


NEUROLOGICAL: Gross neurological examination did not reveal any focal deficits. 


SKIN: No rashes. Scabbed rash on the left upper thigh and abdominal area. 

Redness and swelling to the right elbow





Assessment


Right olecranon bursitis and sepsis


Contact dermatitis


Hypertension


Diabetes mellitus type 2 with hyperglycemia


Hyperlipidemia


Parkinson's disorder


Gastroesophageal reflux disease


Former smoker


GI prophylaxis


DVT prophylaxis


Full code





Plan


Order hydrocortisone ointment to the left upper thigh


Continue IV Kefzol


Pain management


Increase hydralazine to 50 mg PO BID


Add levemir 10 units scheduled at bedtime. Continue accuchecks ACHS and sliding 

scale insulin


Monitor renal function.


PT/OT consultation. 


Return to [facility] on DC. 





The impression and plan of care has been dictated by Sheila Munroe Nurse 

Practitioner as directed.





Dr. Enmanuel MD


I have performed a history and physical examination and medical decision making 

of this patient, discussed the same with the dictator, and agree with the 

dictators assessment and plan as written, documented as a scribe. Based on total

visit time, I have performed more than 50% of this visit.








Objective





- Vital Signs


Vital signs: 


                                   Vital Signs











Temp  99 F   09/15/24 14:15


 


Pulse  86   09/15/24 14:15


 


Resp  16   09/15/24 14:15


 


BP  137/73   09/15/24 14:15


 


Pulse Ox  94 L  09/15/24 14:15


 


FiO2      








                                 Intake & Output











 09/14/24 09/15/24 09/15/24





 18:59 06:59 18:59


 


Intake Total 354  462


 


Output Total 850  250


 


Balance -496  212


 


Intake:   


 


  Oral 354  462


 


Output:   


 


  Urine 850  250


 


Other:   


 


  Voiding Method Diaper Diaper Diaper





 External Catheter External Catheter External Catheter


 


  # Voids  1 1


 


  # Bowel Movements 1 0 2














- Labs


CBC & Chem 7: 


                                 09/14/24 02:52





                                 09/14/24 02:52


Labs: 


                  Abnormal Lab Results - Last 24 Hours (Table)











  09/14/24 09/14/24 09/15/24 Range/Units





  16:58 20:37 06:23 


 


POC Glucose (mg/dL)  167 H  211 H  187 H  ()  mg/dL














  09/15/24 Range/Units





  11:10 


 


POC Glucose (mg/dL)  301 H  ()  mg/dL








                      Microbiology - Last 24 Hours (Table)











 09/11/24 22:32 Blood Culture - Preliminary





 Blood 


 


 09/11/24 22:17 Blood Culture - Preliminary





 Blood 














Assessment and Plan


Time with Patient: Less than 30

## 2024-09-16 VITALS
TEMPERATURE: 98.7 F | RESPIRATION RATE: 15 BRPM | HEART RATE: 72 BPM | SYSTOLIC BLOOD PRESSURE: 115 MMHG | DIASTOLIC BLOOD PRESSURE: 64 MMHG

## 2024-09-16 LAB
ANION GAP SERPL CALC-SCNC: 13.7 MMOL/L (ref 4–12)
BUN SERPL-SCNC: 16.6 MG/DL (ref 9–27)
BUN/CREAT SERPL: 18.44 RATIO (ref 12–20)
CALCIUM SPEC-MCNC: 9.4 MG/DL (ref 8.7–10.3)
CHLORIDE SERPL-SCNC: 101 MMOL/L (ref 96–109)
CO2 SERPL-SCNC: 24.3 MMOL/L (ref 21.6–31.8)
GLUCOSE BLD-MCNC: 197 MG/DL (ref 70–110)
GLUCOSE BLD-MCNC: 222 MG/DL (ref 70–110)
GLUCOSE BLD-MCNC: 314 MG/DL (ref 70–110)
GLUCOSE SERPL-MCNC: 236 MG/DL (ref 70–110)
POTASSIUM SERPL-SCNC: 4 MMOL/L (ref 3.5–5.5)
SODIUM SERPL-SCNC: 139 MMOL/L (ref 135–145)

## 2024-09-16 PROCEDURE — 05HY33Z INSERTION OF INFUSION DEVICE INTO UPPER VEIN, PERCUTANEOUS APPROACH: ICD-10-PCS

## 2024-09-16 RX ADMIN — HYDROMORPHONE HYDROCHLORIDE PRN MG: 1 INJECTION, SOLUTION INTRAMUSCULAR; INTRAVENOUS; SUBCUTANEOUS at 14:38

## 2024-09-16 NOTE — CDI
Documentation Clarification Form



Date: 9/16/2024

From: Lou Hamilton RN CCDS

Phone: [phone]

MRN: [id number]

Admit Date: 09/12/2024 08:43:00 AM

Patient Name: Keith So

Visit Number: [id number]

Discharge Date:  





ATTENTION: The Clinical Documentation Specialists (CDI) and [facility] Coding Staff 
appreciate your assistance in clarifying documentation. Please respond to the 
clarification below the line at the bottom and electronically sign. The CDI & 
[facility] Coding staff will review the response and follow-up if needed. Please note: 
Queries are made part of the Legal Health Record. If you have any questions, 
please contact the author of this message via ITS.



Doctor/Provider: Sanjiv Singer MD:





Sepsis is documented in the IM progress note 9/15 which may lack sufficient 
clinical evidence/support in the medical record. Additional clarification is 
requested.



History/Risk Factors:  68-year-old male with a history of HTN, DM2, Parkinson's 
who presents with right elbow swelling and pain after fall and failure of 
outpatient antibiotics



Clinical Indicators: 9/11 Triage VS: 150/79, 100.1, 89, 20, 95% room air

9/11-9/16 Temperature range: 97.7(9/12) - 100.1(9/11)



9/13 ID PN, Plan: "1patient presented to hospital with right elbow pain swelling
redness which is

likely related to right elbow olecranon bursitis that has been drained at [facility] during his admission from 9/2 through 9/5/2024 I was able to 
review the culture and they have been growing MSSA patient seem to have failed 
outpatient oral antibiotic therapy"



9/15 IM PN, Assessment: "Right olecranon bursitis and sepsis."



9/11, 9/13, 9/14 WBC: 12.1, 10.33, 9.29

9/11 Lactic Acid: 2.9, 1.6

9/11 C - reactive protein: 4.4

9/11 Blood Culture: No growth after 72 hours



Treatment: Kefzol 2gram IV Q8 hours start 9/12

 

After work up and study, please clarify which diagnosis is most appropriate?



[  ] Sepsis ruled out

[  ] Sepsis treated prophylactically

[ x ] Sepsis POA and treated

[  ] Sepsis is a valid diagnosis as evidence by the following: (Please add 
rationale): ____________________

[  ] Other, please specify _____________

[  ] Unable to determine





SIRS Criteria: 2 or more of the following may indicate SIRS   

Temperature         < 96.8F (36C) or > 101.0F (38.3C)

Heart Rate         > 90 bpm

Respiratory Rate      > 20 breaths/min or PaCO2 < 32 mmHg

White Blood Cell Count   > 12,000 or < 4,000 cells/mm3 or > 10% bands

MTDD

## 2024-09-16 NOTE — P.DS
Providers


Date of admission: 


09/12/24 08:43





Attending physician: 


Gigi Hung MD





Consults: 





                                        





09/11/24 23:12


Consult Physician Urgent 


   Consulting Provider: Tarik Amato


   Consult Reason/Comments: cellulitis right upper extremity


   Do you want consulting provider notified?: Yes





09/12/24 13:53


Consult Physician Routine 


   Consulting Provider: Annamarie Mary


   Consult Reason/Comments: right elbow cellulitis - not improved outpatient 

with oral


                                                antibiotcs


   Do you want consulting provider notified?: Yes











Primary care physician: 


Dhaval August MD





Hospital Course: 





Hospital Course:


Patient is a 68-year-old male who presents with right elbow swelling and pain.  

He states that he has been having difficulty with balance/weakness and recently 

fell.  Since his fall he has had right elbow swelling and erythemahe went to 

[facility] (according to his wife) the other day where they drained it and 

prescribed outpatient antibiotics.  Since then the elbow has not improved so he 

was admitted here.  He rates the pain a 6/10.  He denies chest pain, shortness 

of breath, abdominal pain.  He endorses nonbloody diarrhea since beginning the 

antibiotics from [facility].





WBCs 12.1, lactic acid 2.9, CRP 4.4


Afebrile, hypertensive.





09/13.  Patient seen and examined lying in bed.  Per infectious disease consult:

Patient had right elbow olecranon bursitis drained bedside at [facility]; 

cultures grew MSSA but patient seems to have failed outpatient oral antibiotic 

therapy so he was started on IV cefazolin 2 g every 8 hours.  Patient denies 

chest pain, shortness of breath.  Continues to endorse full range of motion.  

Patient remains afebrile.  WBCs 10.33





09/14.  Patient seen lying comfortably in bed.  He remains on IV cefazolin 2 g 

every 8 hours.  He continues to endorse full range of motion.  He is afebrile.  

Reports improved diarrhea.  C.difficile is negative.  WBCs 9.2.





09/15.  Patient is evaluated in follow-up on the medical floor with family at 

e bedside.  He continues to report minimal to moderate pain to his elbow there 

continues to be some redness and swelling.  Orthopedics is not planning on doing

any I&D's at this time.  He remains on IV Kefzol.  He is noted to have a scabbed

over rash on the left upper thigh and abdominal area.





09/16.  Patient seen and lying in bed comfortably.  He continues to endorse 

minimal pain to his elbow with full range of motion.  He remains on IV 

cefazolin.  Diarrhea has continued to improve.  Midline has been placed for 

administration of outpatient IV antibiotics.  Patiently is hemodynamically 

stable for discharge.





Final Diagnosis


 Cellulitis of the right elbow


 Right olecranon bursitis


 Hypertension


 Diabetes mellitus type 2


 Hyperlipidemia


 Parkinson's





Physical Examination:


Vital signs are stable.


General: No acute distress.


HEENT:  Head exam is unremarkable. 


Lungs: Bilateral breath sounds present; no rhonchi, wheezes, or rales.


Heart: Rate and rhythm are regular. 


Abdomen: Nontender.


Extremities: No edema present.





Attestation


I have seen and examined this patient with my resident , discussed the same with

the resident/ANA, and  agree with the dictator's assessment and plan as written 





Dr. Edilberto knox





Patient Condition at Discharge: Good





Plan - Discharge Summary


Discharge Rx Participant: No


New Discharge Prescriptions: 


New


   ceFAZolin [Kefzol] 2 gm IVP Q8HR #30 each


   hydrALAZINE HCL [Apresoline] 50 mg PO BID #30 tab


   INSULIN ASPART (NovoLOG) [NovoLOG (formulary)] 0 unit SQ ACHS #1 each





Continue


   Carbidopa/Levodopa [Carbidopa/Levodopa  Tab] 1 tab PO Q8H


   Losartan Potassium 100 mg PO DAILY


   QUEtiapine [SEROquel] 50 mg PO HS


   0.9 % Sodium Chloride [Sodium Chloride Flush] 10 ml IV Q12H PRN


     PRN Reason: MAINTAIN PATENCY


   Nicotine 14Mg/24Hr Patch [Habitrol] 1 patch TRANSDERM DAILY


   metFORMIN HCL [Glucophage] 1,000 mg PO BID


   hydroCHLOROthiazide [Hydrodiuril] 25 mg PO DAILY


   DULoxetine HCL [Cymbalta] 30 mg PO DAILY


   Acetaminophen [Tylenol 8 Hour] 650 mg PO Q6H PRN


     PRN Reason: Pain


   Pantoprazole Sodium 40 mg PO DAILY


   Escitalopram [Lexapro] 10 mg PO HS


   Atorvastatin [Lipitor] 40 mg PO HS


   Empagliflozin [Jardiance] 10 mg PO DAILY


   0.9 % Sodium Chloride [Sodium Chloride Flush] 10 ml IV Q12H


   L.acidoph,Paracasei, B.lactis [Probiotic] 1 cap PO HS


   Tamsulosin HCl [Flomax] 0.4 mg PO DAILY


   EPINEPHrine (Auto Inject) [Epipen] 0.3 mg IM ONCE PRN


     PRN Reason: Anaphylaxis





Changed


   Insulin Glargine,Hum.rec.anlog [Basaglar Kwikpen U-100] 20 unit SQ DAILY #1 

each





Discontinued


   hydrALAZINE HCL 25 mg PO BID


   oxyCODONE HCL [oxyCODONE HCL (IR)] 5 mg PO Q6H PRN


     PRN Reason: Pain


   Naloxone HCl [Narcan] 4 mg NASAL ONCE PRN


     PRN Reason: OVERDOSE


   cefTRIAXone SODIUM [Ceftriaxone] 2 gm IV Q24H


   Amoxic-Pot Clav 875-125Mg [Augmentin 875-125] 1 tab PO Q12HR


   Doxycycline Monohydrate 100 mg PO BID


Discharge Medication List





Atorvastatin [Lipitor] 40 mg PO HS 08/31/22 [History]


Carbidopa/Levodopa [Carbidopa/Levodopa  Tab] 1 tab PO Q8H 08/31/22 

[History]


Empagliflozin [Jardiance] 10 mg PO DAILY 08/31/22 [History]


Escitalopram [Lexapro] 10 mg PO HS 08/31/22 [History]


Losartan Potassium 100 mg PO DAILY 08/31/22 [History]


Pantoprazole Sodium 40 mg PO DAILY 08/31/22 [History]


0.9 % Sodium Chloride [Sodium Chloride Flush] 10 ml IV Q12H 09/12/24 [History]


0.9 % Sodium Chloride [Sodium Chloride Flush] 10 ml IV Q12H PRN 09/12/24 

[History]


Acetaminophen [Tylenol 8 Hour] 650 mg PO Q6H PRN 09/12/24 [History]


DULoxetine HCL [Cymbalta] 30 mg PO DAILY 09/12/24 [History]


EPINEPHrine (Auto Inject) [Epipen] 0.3 mg IM ONCE PRN 09/12/24 [History]


L.acidoph,Paracasei, B.lactis [Probiotic] 1 cap PO HS 09/12/24 [History]


Nicotine 14Mg/24Hr Patch [Habitrol] 1 patch TRANSDERM DAILY 09/12/24 [History]


QUEtiapine [SEROquel] 50 mg PO HS 09/12/24 [History]


Tamsulosin HCl [Flomax] 0.4 mg PO DAILY 09/12/24 [History]


hydroCHLOROthiazide [Hydrodiuril] 25 mg PO DAILY 09/12/24 [History]


metFORMIN HCL [Glucophage] 1,000 mg PO BID 09/12/24 [History]


INSULIN ASPART (NovoLOG) [NovoLOG (formulary)] 0 unit SQ ACHS #1 each 09/16/24 

[Rx]


Insulin Glargine,Hum.rec.anlog [Basaglar Kwikpen U-100] 20 unit SQ DAILY #1 each

09/16/24 [Rx]


ceFAZolin [Kefzol] 2 gm IVP Q8HR #30 each 09/16/24 [Rx]


hydrALAZINE HCL [Apresoline] 50 mg PO BID #30 tab 09/16/24 [Rx]








Follow up Appointment(s)/Referral(s): 


Dhaval August MD [Primary Care Provider] - 1-2 days


Annamarie Mary MD [STAFF PHYSICIAN] - 1 Week


Tarik Amato MD [STAFF PHYSICIAN] - 1 Week


Activity/Diet/Wound Care/Special Instructions: 


Low-dose scale:


Glucose less than 70initiate hypoglycemia orders


 - 0 units


151-200 - 2 units


201-250 - 4 units


251-300 - 6 units


301-350 - 8 units


351-400 - 10 units


Greater than 400 - 12 units and call provider


Discharge Disposition: TRANSFER TO SNF/ECF

## 2024-09-17 NOTE — P.PN
Subjective


Progress Note Date: 09/16/24


Principal diagnosis: 


Reason for follow-up is right elbow olecranon bursitis








Patient is a 68-year-old  male with a past medical history significant 

for diabetes mellitus hypertension hyperlipidemia reflux osteoarthritis prostate

disorder in this patient apparently was recently admitted at [facility] from 9/2/2024 till 9/5/2024 with the patient has been treated for UTI 

he was also diagnosed with a right elbow olecranon bursitis status post bedside 

aspiration by the hospitalist, has not been readmitted to hospital with 

worsening right olecranon bursitis failing outpatient or antibiotic therapy.


On today's evaluation that is 09/16/2024, Patient continues to be afebrile, the 

patient denies shortness of breath, chest pain or cough, the patient denies any 

nausea vomiting did not have any abdominal pain and no diarrhea, still 

complaining of pain to the right elbow area.


Patient white count is 9.29 did have a creatinine 0.9 blood culture negative





Objective





- Vital Signs


Vital signs: 


                                   Vital Signs











Temp  98.3 F   09/16/24 07:00


 


Pulse  68   09/16/24 07:00


 


Resp  16   09/16/24 07:00


 


BP  161/77   09/16/24 07:00


 


Pulse Ox  96   09/16/24 07:00


 


FiO2      








                                 Intake & Output











 09/15/24 09/16/24 09/16/24





 18:59 06:59 18:59


 


Intake Total 462  118


 


Output Total 1100 200 100


 


Balance -638 -200 18


 


Intake:   


 


  Oral 462  118


 


Output:   


 


  Urine 1100 200 100


 


Other:   


 


  Voiding Method Diaper Diaper Diaper





 External Catheter External Catheter 


 


  # Voids 1 1 


 


  # Bowel Movements 2  














- Exam





GENERAL DESCRIPTION: An elderly male lying in bed in no distress





RESPIRATORY SYSTEM: Unlabored breathing , decreased breath sounds at bases





HEART: S1 S2 regular rate and rhythm ,





ABDOMEN: Soft , no tenderness





EXTREMITIES: Right elbow swelling redness slightly decreased





- Labs


CBC & Chem 7: 


                                 09/14/24 02:52





                                 09/16/24 02:50


Labs: 


                  Abnormal Lab Results - Last 24 Hours (Table)











  09/15/24 09/15/24 09/15/24 Range/Units





  11:10 17:07 20:12 


 


Anion Gap     (4.00-12.00)  mmol/L


 


Glucose     ()  mg/dL


 


POC Glucose (mg/dL)  301 H  301 H  243 H  ()  mg/dL














  09/16/24 09/16/24 Range/Units





  02:50 05:23 


 


Anion Gap  13.70 H   (4.00-12.00)  mmol/L


 


Glucose  236 H   ()  mg/dL


 


POC Glucose (mg/dL)   222 H  ()  mg/dL














Assessment and Plan


(1) Cellulitis of right upper extremity


Status: Acute   Code(s): L03.113 - CELLULITIS OF RIGHT UPPER LIMB   SNOMED 

Code(s): 40017979646748548


   





(2) Olecranon bursitis, right elbow


Status: Acute   Code(s): M70.21 - OLECRANON BURSITIS, RIGHT ELBOW   SNOMED 

Code(s): 618358101557605


   





(3) Failure of outpatient treatment


Status: Acute   Code(s): Z78.9 - OTHER SPECIFIED HEALTH STATUS   SNOMED Code(s):

885250581


   





(4) MSSA (methicillin susceptible Staphylococcus aureus) infection


Status: Acute   Code(s): A49.01 - METHICILLIN SUSCEP STAPH INFECTION, UNSP SITE 

 SNOMED Code(s): 852196780


   


Plan: 





1patient presented to hospital with right elbow pain swelling redness which is 

likely related to right elbow olecranon bursitis that has been drained at [facility] during his admission from 9/2 through 9/5/2024 I was able to 

review the culture and they have been growing MSSA patient seem to have failed 

outpatient oral antibiotic therapy


2-patient did have rather slow clinical improvement to the right elbow as it was

not drained continue with the cefazolin for 10 days on discharge and close 

outpatient follow-up


Dictation was produced using dragon dictation software. please excuse any 

grammatical, word or spelling errors.